# Patient Record
Sex: FEMALE | Race: WHITE | NOT HISPANIC OR LATINO | URBAN - METROPOLITAN AREA
[De-identification: names, ages, dates, MRNs, and addresses within clinical notes are randomized per-mention and may not be internally consistent; named-entity substitution may affect disease eponyms.]

---

## 2016-03-30 LAB — CRC RECOMMENDATION EXT: NORMAL

## 2021-05-27 LAB — CRC RECOMMENDATION EXT: NORMAL

## 2022-12-29 LAB
HUMAN PAPILLOMAVIRUS (HPV): NORMAL
PAP RECOMMENDATION EXT: NORMAL

## 2023-06-05 DIAGNOSIS — D05.11 DUCTAL CARCINOMA IN SITU (DCIS) OF RIGHT BREAST: Primary | ICD-10-CM

## 2023-06-05 DIAGNOSIS — R92.1 BREAST CALCIFICATION, RIGHT: ICD-10-CM

## 2023-06-05 DIAGNOSIS — Z86.000 HISTORY OF DUCTAL CARCINOMA IN SITU OF BREAST: ICD-10-CM

## 2023-06-05 DIAGNOSIS — D24.1 PAPILLOMA OF RIGHT BREAST: ICD-10-CM

## 2023-06-13 ENCOUNTER — HOSPITAL ENCOUNTER (OUTPATIENT)
Dept: RADIOLOGY | Facility: OTHER | Age: 58
Discharge: HOME | End: 2023-06-13
Attending: SURGERY
Payer: COMMERCIAL

## 2023-06-13 DIAGNOSIS — Z86.000 HISTORY OF DUCTAL CARCINOMA IN SITU OF BREAST: ICD-10-CM

## 2023-06-13 DIAGNOSIS — D24.1 PAPILLOMA OF RIGHT BREAST: ICD-10-CM

## 2023-06-13 DIAGNOSIS — R92.1 BREAST CALCIFICATION, RIGHT: ICD-10-CM

## 2023-06-13 DIAGNOSIS — D05.11 DUCTAL CARCINOMA IN SITU (DCIS) OF RIGHT BREAST: ICD-10-CM

## 2023-06-13 PROCEDURE — A9577 INJ MULTIHANCE: HCPCS

## 2023-06-13 PROCEDURE — 25500020 PHARM REV CODE 255

## 2023-06-13 PROCEDURE — 77049 MRI BREAST C-+ W/CAD BI: CPT | Mod: 26,,, | Performed by: RADIOLOGY

## 2023-06-13 PROCEDURE — 77049 MRI BREAST C-+ W/CAD BI: CPT | Mod: TC

## 2023-06-13 PROCEDURE — 77049 MRI BREAST W/WO CONTRAST, W/CAD, BILATERAL: ICD-10-PCS | Mod: 26,,, | Performed by: RADIOLOGY

## 2023-06-13 RX ADMIN — GADOBENATE DIMEGLUMINE 13 ML: 529 INJECTION, SOLUTION INTRAVENOUS at 03:06

## 2023-06-22 ENCOUNTER — TELEPHONE (OUTPATIENT)
Dept: RADIOLOGY | Facility: OTHER | Age: 58
End: 2023-06-22
Payer: COMMERCIAL

## 2023-06-22 ENCOUNTER — PATIENT MESSAGE (OUTPATIENT)
Dept: RADIOLOGY | Facility: OTHER | Age: 58
End: 2023-06-22
Payer: COMMERCIAL

## 2023-07-06 ENCOUNTER — OFFICE VISIT (OUTPATIENT)
Dept: PRIMARY CARE CLINIC | Facility: CLINIC | Age: 58
End: 2023-07-06
Payer: COMMERCIAL

## 2023-07-06 ENCOUNTER — TELEPHONE (OUTPATIENT)
Dept: PRIMARY CARE CLINIC | Facility: CLINIC | Age: 58
End: 2023-07-06

## 2023-07-06 VITALS
OXYGEN SATURATION: 96 % | DIASTOLIC BLOOD PRESSURE: 64 MMHG | HEART RATE: 79 BPM | SYSTOLIC BLOOD PRESSURE: 118 MMHG | WEIGHT: 139.13 LBS | HEIGHT: 63 IN | BODY MASS INDEX: 24.65 KG/M2

## 2023-07-06 DIAGNOSIS — H91.90 HEARING LOSS, UNSPECIFIED HEARING LOSS TYPE, UNSPECIFIED LATERALITY: ICD-10-CM

## 2023-07-06 DIAGNOSIS — Z85.3 HISTORY OF BREAST CANCER: ICD-10-CM

## 2023-07-06 DIAGNOSIS — Z80.0 FAMILY HISTORY OF COLON CANCER: ICD-10-CM

## 2023-07-06 DIAGNOSIS — Z00.00 WELLNESS EXAMINATION: Primary | ICD-10-CM

## 2023-07-06 PROCEDURE — 99386 PR PREVENTIVE VISIT,NEW,40-64: ICD-10-PCS | Mod: S$GLB,,, | Performed by: INTERNAL MEDICINE

## 2023-07-06 PROCEDURE — 3074F PR MOST RECENT SYSTOLIC BLOOD PRESSURE < 130 MM HG: ICD-10-PCS | Mod: CPTII,S$GLB,, | Performed by: INTERNAL MEDICINE

## 2023-07-06 PROCEDURE — 3008F BODY MASS INDEX DOCD: CPT | Mod: CPTII,S$GLB,, | Performed by: INTERNAL MEDICINE

## 2023-07-06 PROCEDURE — 99999 PR PBB SHADOW E&M-EST. PATIENT-LVL IV: CPT | Mod: PBBFAC,,, | Performed by: INTERNAL MEDICINE

## 2023-07-06 PROCEDURE — 1159F PR MEDICATION LIST DOCUMENTED IN MEDICAL RECORD: ICD-10-PCS | Mod: CPTII,S$GLB,, | Performed by: INTERNAL MEDICINE

## 2023-07-06 PROCEDURE — 3074F SYST BP LT 130 MM HG: CPT | Mod: CPTII,S$GLB,, | Performed by: INTERNAL MEDICINE

## 2023-07-06 PROCEDURE — 99386 PREV VISIT NEW AGE 40-64: CPT | Mod: S$GLB,,, | Performed by: INTERNAL MEDICINE

## 2023-07-06 PROCEDURE — 3078F PR MOST RECENT DIASTOLIC BLOOD PRESSURE < 80 MM HG: ICD-10-PCS | Mod: CPTII,S$GLB,, | Performed by: INTERNAL MEDICINE

## 2023-07-06 PROCEDURE — 3078F DIAST BP <80 MM HG: CPT | Mod: CPTII,S$GLB,, | Performed by: INTERNAL MEDICINE

## 2023-07-06 PROCEDURE — 3008F PR BODY MASS INDEX (BMI) DOCUMENTED: ICD-10-PCS | Mod: CPTII,S$GLB,, | Performed by: INTERNAL MEDICINE

## 2023-07-06 PROCEDURE — 1159F MED LIST DOCD IN RCRD: CPT | Mod: CPTII,S$GLB,, | Performed by: INTERNAL MEDICINE

## 2023-07-06 PROCEDURE — 99999 PR PBB SHADOW E&M-EST. PATIENT-LVL IV: ICD-10-PCS | Mod: PBBFAC,,, | Performed by: INTERNAL MEDICINE

## 2023-07-06 RX ORDER — ACETAMINOPHEN 500 MG
1000 TABLET ORAL DAILY
COMMUNITY

## 2023-07-06 RX ORDER — LETROZOLE 2.5 MG/1
2.5 TABLET, FILM COATED ORAL DAILY
COMMUNITY
Start: 2023-04-18 | End: 2023-10-10 | Stop reason: SDUPTHER

## 2023-07-06 NOTE — TELEPHONE ENCOUNTER
----- Message from Serenity Lubin MD sent at 7/6/2023  2:09 PM CDT -----  Porter Garvey in [facility]  last pap   Sherri Winn in [place] at [facility] colonoscopy

## 2023-07-06 NOTE — LETTER
AUTHORIZATION FOR RELEASE OF   CONFIDENTIAL INFORMATION    Dear [facility],    We are seeing Huong Patino, date of birth 1965, in the clinic at [facility]. Serenity Lubin MD is the patient's PCP. Huong Patino has an outstanding lab/procedure at the time we reviewed her chart. In order to help keep her health information updated, she has authorized us to request the following medical record(s):        (X)  MAMMOGRAM                                      (X)  COLONOSCOPY      (X)  PAP SMEAR                                          (  )  OUTSIDE LAB RESULTS     (  )  DEXA SCAN                                          (  )  EYE EXAM            (  )  FOOT EXAM                                          (  )  ENTIRE RECORD     (  )  OUTSIDE IMMUNIZATIONS                 (  )  _______________         Please fax records to [facility], Abby E. Gandolfi, MD, [phone]     If you have any questions, please contact [facility] at [phone].           Patient Name: Huong Patino  : 1965  Patient Phone #: [phone]

## 2023-07-06 NOTE — PROGRESS NOTES
[facility] Note    Chief Complaint      Chief Complaint   Patient presents with    [facility]     History of Present Illness      Huong Patino is a 57 y.o. female who presents today for chief complaint annual wellness and est care . Patient is new to me.    PCP: Serenity Lubin MD  Patient comes to appointment alone.     HPI   She recently moved here from [place], her daughter lives in . She is originally from [place]   H/o DCIS, dx in  she has had lumpectomy x1 and radiation, she had a follow up MMG and showed a new abnl so she is having a follow up lumpectomy 7.13 to exclude dcis on pathology, thought to be atypia 2/2 radiation    Breast mri 6.23. She would liek to est with high risk breast here   She is otherwise healthy   Some hearing changes, hearing loss in loud spaces, thinks he has some L ear TM damage 2/2 ear bud use   Some orthopedic issues   Saw Gyn in Dec   Father (73) and PGF(80s) ho colon cancer, both  of but at advanced ages, she had a cscope in , needed a 5 year follow   Genetic testing was discussed but ultimately not done due to advanced at at time of dx of colon cancer  She has had dexa in  was good   I personally reviewed all past medical, surgical, social and family history.  No other family medical history   She works a  and helath  business   Mood is ok, sleep is ok,   Diet and exercise as above     She had labs in  unremarkable, will wait until next yeat for lipids     Active Problem List with Overview Notes    Diagnosis Date Noted    History of breast cancer 2023     DCIS R breast dx       Family history of colon cancer 2023       Health Maintenance   Topic Date Due    Hepatitis C Screening  Never done    Lipid Panel  Never done    TETANUS VACCINE  Never done    Mammogram  2023       Past Medical History:   Diagnosis Date    Breast cancer        Past Surgical History:   Procedure Laterality Date     "BREAST SURGERY  2022    TONSILLECTOMY  1968       family history includes Cancer in her maternal grandfather; Hypertension in her mother.    Social History     Tobacco Use    Smoking status: Former     Packs/day: 0.25     Years: 5.00     Pack years: 1.25     Types: Cigarettes     Start date: 1984     Quit date: 1989     Years since quittin.5     Passive exposure: Never    Smokeless tobacco: Never   Substance Use Topics    Alcohol use: Yes     Alcohol/week: 3.0 standard drinks     Types: 3 Glasses of wine per week     Comment: over 2-3 days    Drug use: Never       Review of Systems   Constitutional:  Negative for chills, fever, malaise/fatigue and weight loss.   Respiratory:  Negative for cough, sputum production, shortness of breath and wheezing.    Cardiovascular:  Negative for chest pain, palpitations, orthopnea and leg swelling.   Gastrointestinal:  Negative for constipation, diarrhea, nausea and vomiting.   Genitourinary:  Negative for dysuria, frequency, hematuria and urgency.      Outpatient Encounter Medications as of 2023   Medication Sig Dispense Refill    cholecalciferol, vitamin D3, (VITAMIN D3) 50 mcg (2,000 unit) Cap capsule Take by mouth.      letrozole (FEMARA) 2.5 mg Tab Take 2.5 mg by mouth once daily.       No facility-administered encounter medications on file as of 2023.        Review of patient's allergies indicates:  No Known Allergies        Physical Exam      Vital Signs  Pulse: 79  SpO2: 96 %  BP: 118/64  BP Location: Left arm  Patient Position: Sitting  Height and Weight  Height: 5' 3" (160 cm)  Weight: 63.1 kg (139 lb 1.8 oz)  BSA (Calculated - sq m): 1.67 sq meters  BMI (Calculated): 24.6  Weight in (lb) to have BMI = 25: 140.8]    Physical Exam  Vitals reviewed.   Constitutional:       General: She is not in acute distress.     Appearance: She is well-developed. She is not diaphoretic.   HENT:      Head: Normocephalic and atraumatic.      Right Ear: External " ear normal.      Left Ear: External ear normal.      Nose: Nose normal.   Eyes:      General:         Right eye: No discharge.         Left eye: No discharge.      Conjunctiva/sclera: Conjunctivae normal.   Cardiovascular:      Rate and Rhythm: Normal rate and regular rhythm.      Heart sounds: Normal heart sounds.   Pulmonary:      Effort: Pulmonary effort is normal. No respiratory distress.      Breath sounds: Normal breath sounds.   Musculoskeletal:         General: Normal range of motion.      Cervical back: Normal range of motion.   Skin:     Coloration: Skin is not pale.      Findings: No rash.   Neurological:      Mental Status: She is alert and oriented to person, place, and time.   Psychiatric:         Behavior: Behavior normal.         Thought Content: Thought content normal.        Laboratory:  CBC:  No results for input(s): WBC, RBC, HGB, HCT, PLT, MCV, MCH, MCHC in the last 2160 hours.  CMP:  No results for input(s): GLU, CALCIUM, ALBUMIN, PROT, NA, K, CO2, CL, BUN, ALKPHOS, ALT, AST, BILITOT in the last 2160 hours.    Invalid input(s): CREATININ  URINALYSIS:  No results for input(s): COLORU, CLARITYU, SPECGRAV, PHUR, PROTEINUA, GLUCOSEU, BILIRUBINCON, BLOODU, WBCU, RBCU, BACTERIA, MUCUS, NITRITE, LEUKOCYTESUR, UROBILINOGEN, HYALINECASTS in the last 2160 hours.   LIPIDS:  No results for input(s): TSH, HDL, CHOL, TRIG, LDLCALC, CHOLHDL, NONHDLCHOL, TOTALCHOLEST in the last 2160 hours.  TSH:  No results for input(s): TSH in the last 2160 hours.  A1C:  No results for input(s): HGBA1C in the last 2160 hours.    Radiology:      Assessment/Plan     Huong Patino is a 57 y.o.female with:    1. Wellness examination    2. History of breast cancer  Overview:  DCIS R breast dx     Orders:  -     Ambulatory referral/consult to Oncology; Future; Expected date: 07/13/2023  -     Ambulatory referral/consult to Breast Surgery; Future; Expected date: 07/13/2023    3. Family history of colon cancer    4. Hearing loss,  unspecified hearing loss type, unspecified laterality  -     Ambulatory referral/consult to Audiology; Future; Expected date: 07/13/2023         Use of the [facility] Patient Portal discussed and encouraged during today's visit  -Continue current medications and maintain follow up with specialists.  Return to clinic in 1 year prn .  No future appointments.    Serenity Lubin MD  7/6/2023 1:51 PM    Primary Care Internal Medicine

## 2023-07-06 NOTE — Clinical Note
Porter Sheldon Gyn in [facility]  last pap  Sherri Winn in [place] at [facility] colonoscopy

## 2023-07-07 ENCOUNTER — PATIENT MESSAGE (OUTPATIENT)
Dept: ADMINISTRATIVE | Facility: HOSPITAL | Age: 58
End: 2023-07-07
Payer: COMMERCIAL

## 2023-07-07 ENCOUNTER — TELEPHONE (OUTPATIENT)
Dept: SURGERY | Facility: CLINIC | Age: 58
End: 2023-07-07
Payer: COMMERCIAL

## 2023-07-17 ENCOUNTER — PATIENT OUTREACH (OUTPATIENT)
Dept: ADMINISTRATIVE | Facility: HOSPITAL | Age: 58
End: 2023-07-17
Payer: COMMERCIAL

## 2023-07-17 NOTE — PROGRESS NOTES
Health Maintenance Due   Topic Date Due    Hepatitis C Screening  Never done    Lipid Panel  Never done    COVID-19 Vaccine (1) Never done    HIV Screening  Never done    TETANUS VACCINE  Never done    Colorectal Cancer Screening  Never done    Shingles Vaccine (1 of 2) Never done     Chart review done. HM updated. Immunizations reviewed & updated. Care Everywhere updated.  Pap from 12/29/22 uploaded to chart.

## 2023-07-25 ENCOUNTER — CLINICAL SUPPORT (OUTPATIENT)
Dept: AUDIOLOGY | Facility: CLINIC | Age: 58
End: 2023-07-25
Payer: COMMERCIAL

## 2023-07-25 DIAGNOSIS — H91.90 HEARING LOSS, UNSPECIFIED HEARING LOSS TYPE, UNSPECIFIED LATERALITY: ICD-10-CM

## 2023-07-25 DIAGNOSIS — H91.93 BILATERAL HEARING LOSS, UNSPECIFIED HEARING LOSS TYPE: Primary | ICD-10-CM

## 2023-07-25 PROCEDURE — 92567 TYMPANOMETRY: CPT | Mod: S$GLB,,, | Performed by: AUDIOLOGIST

## 2023-07-25 PROCEDURE — 99999 PR PBB SHADOW E&M-EST. PATIENT-LVL II: CPT | Mod: PBBFAC,,, | Performed by: AUDIOLOGIST

## 2023-07-25 PROCEDURE — 92557 PR COMPREHENSIVE HEARING TEST: ICD-10-PCS | Mod: S$GLB,,, | Performed by: AUDIOLOGIST

## 2023-07-25 PROCEDURE — 92557 COMPREHENSIVE HEARING TEST: CPT | Mod: S$GLB,,, | Performed by: AUDIOLOGIST

## 2023-07-25 PROCEDURE — 92567 PR TYMPA2METRY: ICD-10-PCS | Mod: S$GLB,,, | Performed by: AUDIOLOGIST

## 2023-07-25 PROCEDURE — 99999 PR PBB SHADOW E&M-EST. PATIENT-LVL II: ICD-10-PCS | Mod: PBBFAC,,, | Performed by: AUDIOLOGIST

## 2023-07-25 NOTE — Clinical Note
Hi Dr. Lubin,  Can you please put in a referral for ENT so Ms. Patino can have the ear wax removed from her left ear.   Thanks,  Serenity

## 2023-07-25 NOTE — PROGRESS NOTES
Huong Patino was seen today in the clinic for an audiologic evaluation.  Patient's main complaint was muffled hearing, especially with background noise present like in restaurants.  Ms. Patino reported she also has noticed her left ear was feeling clogged and was told by her primary care provider that she was impacted with wax. She has been using Debrox to soften the wax and has been using a cotton swab to try to move the wax.     Otoscopy revealed a thin sheet of wax blocking the right EAC and occluding cerumen in the left EAC. Cerumen was removed from the right EAC without incident using an illuminated curette. Wax was also removed from the distal portion of the EAC in the left ear; however, complete removal was not possible due to depth the cerumen had been pushed down the ear canal. Will recommend cleaning with ENT.    Tympanometry revealed Type A in the right ear and Type B (small EAC/cerumen impaction) in the left ear.     Audiogram results revealed essentially normal hearing with a mild hearing loss at 8000 Hz in the right ear and essentially normal hearing with a moderate hearing loss at 8000 Hz in the left ear.      Speech reception thresholds were noted at 10 dB in the right ear and 10 dB in the left ear.    Speech discrimination scores were 100% in the right ear and 100% in the left ear.    Results were discussed and reviewed with Ms. Patino following testing. It is recommended she see ENT regarding cerumen occlusion in the left ear.     Recommendations:  Otologic evaluation with ENT  Annual audiogram  Hearing protection when in noise

## 2023-07-28 ENCOUNTER — PATIENT OUTREACH (OUTPATIENT)
Dept: ADMINISTRATIVE | Facility: HOSPITAL | Age: 58
End: 2023-07-28
Payer: COMMERCIAL

## 2023-07-28 NOTE — PROGRESS NOTES
Health Maintenance Due   Topic Date Due    Hepatitis C Screening  Never done    Lipid Panel  Never done    COVID-19 Vaccine (1) Never done    HIV Screening  Never done    TETANUS VACCINE  Never done    Colorectal Cancer Screening  Never done    Shingles Vaccine (1 of 2) Never done     Chart review done. HM updated. Immunizations reviewed & updated. Care Everywhere updated.  Second fax received for 12/16/23 confirmed upload to chart.     
No

## 2023-07-30 DIAGNOSIS — H61.20 IMPACTED CERUMEN, UNSPECIFIED LATERALITY: Primary | ICD-10-CM

## 2023-10-10 ENCOUNTER — OFFICE VISIT (OUTPATIENT)
Dept: HEMATOLOGY/ONCOLOGY | Facility: CLINIC | Age: 58
End: 2023-10-10
Payer: COMMERCIAL

## 2023-10-10 VITALS
DIASTOLIC BLOOD PRESSURE: 80 MMHG | OXYGEN SATURATION: 96 % | RESPIRATION RATE: 16 BRPM | HEIGHT: 63 IN | BODY MASS INDEX: 25.12 KG/M2 | WEIGHT: 141.75 LBS | HEART RATE: 61 BPM | TEMPERATURE: 98 F | SYSTOLIC BLOOD PRESSURE: 132 MMHG

## 2023-10-10 DIAGNOSIS — Z79.811 USE OF LETROZOLE (FEMARA): ICD-10-CM

## 2023-10-10 DIAGNOSIS — D05.11 DUCTAL CARCINOMA IN SITU (DCIS) OF RIGHT BREAST: Primary | ICD-10-CM

## 2023-10-10 PROCEDURE — 3079F DIAST BP 80-89 MM HG: CPT | Mod: CPTII,S$GLB,, | Performed by: INTERNAL MEDICINE

## 2023-10-10 PROCEDURE — 3075F PR MOST RECENT SYSTOLIC BLOOD PRESS GE 130-139MM HG: ICD-10-PCS | Mod: CPTII,S$GLB,, | Performed by: INTERNAL MEDICINE

## 2023-10-10 PROCEDURE — 3075F SYST BP GE 130 - 139MM HG: CPT | Mod: CPTII,S$GLB,, | Performed by: INTERNAL MEDICINE

## 2023-10-10 PROCEDURE — 1160F RVW MEDS BY RX/DR IN RCRD: CPT | Mod: CPTII,S$GLB,, | Performed by: INTERNAL MEDICINE

## 2023-10-10 PROCEDURE — 99205 PR OFFICE/OUTPT VISIT, NEW, LEVL V, 60-74 MIN: ICD-10-PCS | Mod: S$GLB,,, | Performed by: INTERNAL MEDICINE

## 2023-10-10 PROCEDURE — 3008F BODY MASS INDEX DOCD: CPT | Mod: CPTII,S$GLB,, | Performed by: INTERNAL MEDICINE

## 2023-10-10 PROCEDURE — 1159F PR MEDICATION LIST DOCUMENTED IN MEDICAL RECORD: ICD-10-PCS | Mod: CPTII,S$GLB,, | Performed by: INTERNAL MEDICINE

## 2023-10-10 PROCEDURE — 1159F MED LIST DOCD IN RCRD: CPT | Mod: CPTII,S$GLB,, | Performed by: INTERNAL MEDICINE

## 2023-10-10 PROCEDURE — 99999 PR PBB SHADOW E&M-EST. PATIENT-LVL III: ICD-10-PCS | Mod: PBBFAC,,, | Performed by: INTERNAL MEDICINE

## 2023-10-10 PROCEDURE — 3008F PR BODY MASS INDEX (BMI) DOCUMENTED: ICD-10-PCS | Mod: CPTII,S$GLB,, | Performed by: INTERNAL MEDICINE

## 2023-10-10 PROCEDURE — 1160F PR REVIEW ALL MEDS BY PRESCRIBER/CLIN PHARMACIST DOCUMENTED: ICD-10-PCS | Mod: CPTII,S$GLB,, | Performed by: INTERNAL MEDICINE

## 2023-10-10 PROCEDURE — 3079F PR MOST RECENT DIASTOLIC BLOOD PRESSURE 80-89 MM HG: ICD-10-PCS | Mod: CPTII,S$GLB,, | Performed by: INTERNAL MEDICINE

## 2023-10-10 PROCEDURE — 99999 PR PBB SHADOW E&M-EST. PATIENT-LVL III: CPT | Mod: PBBFAC,,, | Performed by: INTERNAL MEDICINE

## 2023-10-10 PROCEDURE — 99205 OFFICE O/P NEW HI 60 MIN: CPT | Mod: S$GLB,,, | Performed by: INTERNAL MEDICINE

## 2023-10-10 RX ORDER — LETROZOLE 2.5 MG/1
2.5 TABLET, FILM COATED ORAL DAILY
Qty: 90 TABLET | Refills: 3 | Status: SHIPPED | OUTPATIENT
Start: 2023-10-10

## 2023-10-10 NOTE — PROGRESS NOTES
CONSULT NOTE    Subjective:       Patient ID: Huong Patino is a 58 y.o. female.  MRN: [id number]  : 1965    Chief Complaint: Right DCIS     History of Present Illness:   Huong Patino is a 58 y.o. female who is referred for Right breast DCIS. Extensive outside records reviewed and verified with the patient. See below.     Reports having mammogram and US since her 40's. Had an abnormal finding in the right breast in , initial biopsy was negative, then showed ADH. Underwent lumpectomy in April that showed DCIS. Then had adjuvant RT in  followed by Tamoxifen and then switched to Letrozole in  when additional abnormalities were detected. Had a biopsy in 2023 that showed DCIS.     Remains on Letrozole since 2023.   No major issues, maybe some weight gain. Very active, member and  at the [facility]. Denies joint pains, has minimal hot flashes, brought on by coffee or etoh. On vitamin D, had a normal DEXA last year.     Up to date with colonoscopy, will be establishing with Gyn.     Oncology History:  Debi Arrieta's notes 23 [facility] and updated   In summary- there have been 6 biopsies of the right breast  Sono core bx right breast (Dr. Hand) 22- negative  Stereo right breast Ca++ ([facility]) 22- ADH  LUMPECTOMY - DCIS 2.4cm high grade margins clear  MRI bx x 2 22- 6:00 and 10:00- papilloma/benign findings  Then RT right breast -   Went on tamoxifen early November and then switched to femara early February  Mammo and US 22- annual  US core bx- 22- Dr. Hand scar tissue- stromal fibrosis   Stereo Ca++ 23- DCIS right breast    RELEVANT IMAGING HISTORY  Date Facility Study Findings/Biopsy   2022 [facility] MRI of the breast Postcontrast delayed axial images demonstrate no axillary or internal mammary chain lymphadenopathy. No suspicious skin thickening is identified. There is  From: Timmy Chung  To: Balbina Stanley  ASAP. Thanks.     Nasima moderate background parenchymal enhancement within both breasts. The breast demonstrate heterogeneous MR parenchymal density.  Right breast: Postsurgical/postbiopsy changes are identified in the right upper outer breast skin, mid to posterior depth. There is a 5.6 x 2.3 cm postoperative seroma with minimal residual peripheral enhancement. This corresponds with site of the skin wrist of recent surgical excision. In the right slightly upper outer breast posterior depth at approximately 10:00 axis, there is a lobulated enhancing nodule which measures 0.8 in length by 0.6 cm in width by 0.6 cm craniocaudal. This demonstrates type II and type III enhancement kinetics. This is indeterminate. Further evaluation with sampling is recommended. In the right lower central breast mid depth at 6:00 access there is linear non-mass enhancement identified with spans up to 1.6 cm in length by 0.4 cm in width by 0.4 cm craniocaudal this demonstrates type I and type II enhancement kinetics. This is indeterminate. Further evaluation and sampling is recommended. Additional scattered less than 5 mm foci of background enhancement is identified.  Left breast: Scattered less than 5 mm foci of background enhancement are identified. No suspicious dominant enhancement is identified on the left.  Impression:  1. Postsurgical changes in the right upper outer breast including postoperative seroma.  2. 8 mm enhancing nodule in the right breast at 10:00 axis which is indeterminate. Further evaluation with Second Look MRI directed right breast ultrasound to guide biopsy is recommended. If a sonographic correlation is not identified MRI guided biopsy should be considered.  3. 1.6 cm non-mass enhancement in the right breast at 1:00 axis which is indeterminate. Further evaluation with second look MRI directed right breast ultrasound to guide biopsy is recommended. If no sonographic correlate is identified MRI guided biopsy should be considered.  4. No  suspicious dominant enhancement in the left   6/7/2022 [facility] MRI guided biopsy right multiple sites Diagnosis:  A. Right breast 10:00 cylinder enhancement MRI biopsy:  Benign breast tissue and several reactive lymph nodes  B. Right breast, 6:00 cylinder, enhancement MRI biopsy:  Intraductal papilloma  Postprocedure mammogram imaging demonstrates the location device at the targeted area      12/13/22 [facility] B/L Diagnostic Mammogram Findings:  Right breast: There is architectural distortion within the right upper outer quadrant compatible with the patient's history of malignant right lumpectomy. Within the posterior aspect of the right upper outer quadrant is a group of indeterminate microcalcifications for which a biopsy is recommended. They are amenable to a stereotactic vacuum-assisted biopsy. An apparent asymmetry within the right central breast disperses to normal tissue and is stable when compared to multiple prior mammograms. There are no suspicious dominant nodules. There are 3 biopsy clips.  Left breast: There are no new dominant nodules, suspicious clustered microcalcifications or areas of architectural distortion  Impression:  Indeterminate group of microcalcifications within the posterior aspect of the right upper outer quadrant. Stereotactic biopsy is recommended.   12/13/2022 [facility] B/L Breast Ultrasound Findings:  At the 12 o'clock position of the right breast 3 cm from the nipple is a 0.4 x 0.3 x 0.4 cm well-circumscribed cyst with debris. It is slightly smaller in size. The previously identified 0.8 cm hypoechoic nodule at the 9 o'clock position of the right breast 7 cm from the nipple is no longer identified. However at the 9 o'clock position of the right breast 3 to 4 cm from the nipple is a newly demonstrated 0.3 x 0.2 x 0.3 cm hypoechoic nodule. There is no associated shadowing or abnormal internal vascularity. Ultrasound-guided biopsy is recommended. No other  discrete cystic or solid lesions are identified in either breast. The left breast is unremarkable. There are no abnormal lymph nodes identified.  Impression:  Newly demonstrated 0.3 cm hypoechoic nodule at the 9 o'clock position of the right breast 3 to 4 cm from the nipple. Ultrasound-guided biopsy is recommended.   2022 (date unclear on report) Done in the surgeon's office Dr. Hand Ultrasound-guided core biopsy right Diagnosis:  Breast, right, 9:00 4 cm from the nipple:  Benign breast with stromal fibrosis focal microcalcification is noted there is no evidence of atypia or malignancy identified      2023 [facility] Right stereotactic core biopsy Diagnosis:  Right breast posterior calcifications stereotactic biopsy:  High-grade ductal carcinoma in situ, flat type, involving terminal ductules  Associated microcalcifications seen  Benign breast tissue with microcalcifications and multinucleated giant cells  Estrogen receptor 100%  Progesterone receptor 70%  There is no postprocedure mammogram although the procedure report does state that a clip was placed    Mastectomy was recommended but she wanted preservation and went to [facility]     Saw Dr. Lincoln at [facility]  right breast excision with pre-op wire localization on 23.  Pathology showed lobular atrophy and epithelial atypia consistent with radiation effect. No DCIS was seen.       History:  Past Medical History:   Diagnosis Date    Breast cancer       Past Surgical History:   Procedure Laterality Date    BREAST SURGERY  2022    TONSILLECTOMY  1968     Family History   Problem Relation Age of Onset    Hypertension Mother     Cancer Maternal Grandfather         colon cancer      Social History     Tobacco Use    Smoking status: Former     Current packs/day: 0.00     Average packs/day: 0.3 packs/day for 5.0 years (1.3 ttl pk-yrs)     Types: Cigarettes     Start date: 1984     Quit date: 1989     Years since quittin.8      "Passive exposure: Never    Smokeless tobacco: Never   Substance and Sexual Activity    Alcohol use: Yes     Alcohol/week: 3.0 standard drinks of alcohol     Types: 3 Glasses of wine per week     Comment: over 2-3 days    Drug use: Never    Sexual activity: Not Currently     Partners: Female        ROS:   Review of Systems   Constitutional:  Negative for fever, malaise/fatigue and weight loss.   HENT:  Negative for congestion, hearing loss, nosebleeds and sore throat.    Eyes:  Negative for double vision and photophobia.   Respiratory:  Negative for cough, hemoptysis, sputum production, shortness of breath and wheezing.    Cardiovascular:  Negative for chest pain, palpitations, orthopnea and leg swelling.   Gastrointestinal:  Negative for abdominal pain, blood in stool, constipation, diarrhea, heartburn, nausea and vomiting.   Genitourinary:  Negative for dysuria, hematuria and urgency.   Musculoskeletal:  Negative for back pain, joint pain and myalgias.   Skin:  Negative for itching and rash.   Neurological:  Negative for dizziness, tingling, seizures, weakness and headaches.   Endo/Heme/Allergies:  Negative for polydipsia. Does not bruise/bleed easily.   Psychiatric/Behavioral:  Negative for depression and memory loss. The patient is not nervous/anxious and does not have insomnia.         Objective:     Vitals:    10/10/23 1103   BP: 132/80   Pulse: 61   Resp: 16   Temp: 98 °F (36.7 °C)   SpO2: 96%   Weight: 64.3 kg (141 lb 12.1 oz)   Height: 5' 3" (1.6 m)   PainSc: 0-No pain       Physical Examination:   Physical Exam  Vitals and nursing note reviewed.   Constitutional:       General: She is not in acute distress.     Appearance: She is not diaphoretic.   HENT:      Head: Normocephalic.      Mouth/Throat:      Pharynx: No oropharyngeal exudate.   Eyes:      General: No scleral icterus.     Conjunctiva/sclera: Conjunctivae normal.   Neck:      Thyroid: No thyromegaly.   Cardiovascular:      Rate and Rhythm: Normal " "rate and regular rhythm.      Heart sounds: Normal heart sounds. No murmur heard.  Pulmonary:      Effort: Pulmonary effort is normal. No respiratory distress.      Breath sounds: No stridor. No wheezing or rales.   Chest:      Chest wall: No tenderness.   Abdominal:      General: Bowel sounds are normal. There is no distension.      Palpations: Abdomen is soft. There is no mass.      Tenderness: There is no abdominal tenderness. There is no rebound.   Musculoskeletal:         General: No tenderness or deformity. Normal range of motion.      Cervical back: Neck supple.   Lymphadenopathy:      Cervical: No cervical adenopathy.   Skin:     General: Skin is warm and dry.      Findings: No erythema or rash.   Neurological:      Mental Status: She is alert and oriented to person, place, and time.      Cranial Nerves: No cranial nerve deficit.      Coordination: Coordination normal.      Gait: Gait is intact.   Psychiatric:         Mood and Affect: Affect normal.         Cognition and Memory: Memory normal.         Judgment: Judgment normal.          Diagnostic Tests:  Significant Imaging: I have reviewed and interpreted all pertinent imaging results/findings.      Laboratory Data:  All pertinent labs have been reviewed.    Labs:   No results found for: "WBC", "HGB", "HCT", "MCV", "PLT"    Assessment/Plan:   Ductal carcinoma in situ (DCIS) of right breast  -     Mammo Digital Diagnostic Bilat; Future; Expected date: 10/10/2023  -     MRI Breast w/wo Contrast, w/CAD, Bilateral; Future; Expected date: 10/10/2023  -     letrozole (FEMARA) 2.5 mg Tab; Take 1 tablet (2.5 mg total) by mouth once daily.  Dispense: 90 tablet; Refill: 3  -     Ambulatory referral/consult to Gynecology; Future; Expected date: 10/17/2023    Use of letrozole (Femara)       She is s/p lumpectomy, adjuvant RT, brief Tamoxifen, repeat biopsy with DCIS, endocrine therapy changed to Letrozole and repeat lumpectomy with no residual DCIS.     Due to follow " up mammogram in December, will also obtain MRI to establish now post op baseline and will continue yearly mammogram +/- MRI as indicated.     Continue Letrozole.   Continue exercise, calcium and Vitamin D.     ECOG SCORE    0 - Fully active-able to carry on all pre-disease performance without restriction           Discussion:   No follow-ups on file.    Plan was discussed with the patient at length, and she verbalized understanding. Huong was given an opportunity to ask questions that were answered to her satisfaction, and she was advised to call in the interval if any problems or questions arise.    Electronically signed by Laurie Jacques MD    I spent 71 min on this encounter.    Route Chart for Scheduling    Med Onc Chart Routing      Follow up with physician . January 2024   Follow up with [facility]    Infusion scheduling note    Injection scheduling note    Labs    Imaging Mammogram and MRI   both in mid December.   Pharmacy appointment    Other referrals

## 2023-10-18 ENCOUNTER — OFFICE VISIT (OUTPATIENT)
Dept: OTOLARYNGOLOGY | Facility: CLINIC | Age: 58
End: 2023-10-18
Payer: COMMERCIAL

## 2023-10-18 DIAGNOSIS — H61.20 IMPACTED CERUMEN, UNSPECIFIED LATERALITY: ICD-10-CM

## 2023-10-18 PROCEDURE — 69210 REMOVE IMPACTED EAR WAX UNI: CPT | Mod: S$GLB,,, | Performed by: OTOLARYNGOLOGY

## 2023-10-18 PROCEDURE — 99499 NO LOS: ICD-10-PCS | Mod: S$GLB,,, | Performed by: OTOLARYNGOLOGY

## 2023-10-18 PROCEDURE — 99999 PR PBB SHADOW E&M-EST. PATIENT-LVL I: ICD-10-PCS | Mod: PBBFAC,,, | Performed by: OTOLARYNGOLOGY

## 2023-10-18 PROCEDURE — 69210 PR REMOVAL IMPACTED CERUMEN REQUIRING INSTRUMENTATION, UNILATERAL: ICD-10-PCS | Mod: S$GLB,,, | Performed by: OTOLARYNGOLOGY

## 2023-10-18 PROCEDURE — 99499 UNLISTED E&M SERVICE: CPT | Mod: S$GLB,,, | Performed by: OTOLARYNGOLOGY

## 2023-10-18 PROCEDURE — 99999 PR PBB SHADOW E&M-EST. PATIENT-LVL I: CPT | Mod: PBBFAC,,, | Performed by: OTOLARYNGOLOGY

## 2023-10-18 NOTE — PROGRESS NOTES
Has L CI.    Procedure Note:    The patient was brought to the minor procedure room and placed under the operating microscope. Using a combination of suction, curettes and cup forceps the patient's cerumen impaction was removed. The tympanic membrane was evaluated and was unremarkable. The patient tolerated the procedure well. There were no complications.    P: F/U prn.

## 2023-10-25 ENCOUNTER — TELEPHONE (OUTPATIENT)
Dept: RADIOLOGY | Facility: HOSPITAL | Age: 58
End: 2023-10-25
Payer: COMMERCIAL

## 2023-10-25 NOTE — TELEPHONE ENCOUNTER
----- Message from Alex Pan sent at 10/25/2023 10:59 AM CDT -----  Pt would like to be called back asap regarding rescheduling her 12/14/23 mammogram appt    Pt can be reached at [phone].    Thanks

## 2023-10-26 ENCOUNTER — TELEPHONE (OUTPATIENT)
Dept: RADIOLOGY | Facility: HOSPITAL | Age: 58
End: 2023-10-26
Payer: COMMERCIAL

## 2023-10-26 NOTE — TELEPHONE ENCOUNTER
----- Message from Laura Castellano sent at 10/25/2023  3:33 PM CDT -----  Regarding: Reschedule for 12/22/23 or later  Contact: [phone]  Pt requesting a call back to reschedule her mammogram on 12/14 to 12/22 or later. Please call to discuss further.

## 2023-12-22 ENCOUNTER — HOSPITAL ENCOUNTER (OUTPATIENT)
Dept: RADIOLOGY | Facility: HOSPITAL | Age: 58
Discharge: HOME | End: 2023-12-22
Attending: INTERNAL MEDICINE
Payer: COMMERCIAL

## 2023-12-22 DIAGNOSIS — D05.11 DUCTAL CARCINOMA IN SITU (DCIS) OF RIGHT BREAST: ICD-10-CM

## 2023-12-22 PROCEDURE — 77049 MRI BREAST W/WO CONTRAST, W/CAD, BILATERAL: ICD-10-PCS | Mod: 26,,, | Performed by: RADIOLOGY

## 2023-12-22 PROCEDURE — A9577 INJ MULTIHANCE: HCPCS | Performed by: INTERNAL MEDICINE

## 2023-12-22 PROCEDURE — 77049 MRI BREAST C-+ W/CAD BI: CPT | Mod: TC

## 2023-12-22 PROCEDURE — 77049 MRI BREAST C-+ W/CAD BI: CPT | Mod: 26,,, | Performed by: RADIOLOGY

## 2023-12-22 PROCEDURE — 25500020 PHARM REV CODE 255: Performed by: INTERNAL MEDICINE

## 2023-12-22 RX ADMIN — GADOBENATE DIMEGLUMINE 13 ML: 529 INJECTION, SOLUTION INTRAVENOUS at 02:12

## 2023-12-26 ENCOUNTER — HOSPITAL ENCOUNTER (OUTPATIENT)
Dept: RADIOLOGY | Facility: HOSPITAL | Age: 58
Discharge: HOME | End: 2023-12-26
Attending: INTERNAL MEDICINE
Payer: COMMERCIAL

## 2023-12-26 VITALS — HEIGHT: 63 IN | BODY MASS INDEX: 24.45 KG/M2 | WEIGHT: 138 LBS

## 2023-12-26 DIAGNOSIS — D05.11 DUCTAL CARCINOMA IN SITU (DCIS) OF RIGHT BREAST: ICD-10-CM

## 2023-12-26 PROCEDURE — 77062 BREAST TOMOSYNTHESIS BI: CPT | Mod: 26,,, | Performed by: RADIOLOGY

## 2023-12-26 PROCEDURE — 77062 MAMMO DIGITAL DIAGNOSTIC BILAT WITH TOMO: ICD-10-PCS | Mod: 26,,, | Performed by: RADIOLOGY

## 2023-12-26 PROCEDURE — 77062 BREAST TOMOSYNTHESIS BI: CPT | Mod: TC

## 2023-12-26 PROCEDURE — 77066 MAMMO DIGITAL DIAGNOSTIC BILAT WITH TOMO: ICD-10-PCS | Mod: 26,,, | Performed by: RADIOLOGY

## 2023-12-26 PROCEDURE — 77066 DX MAMMO INCL CAD BI: CPT | Mod: 26,,, | Performed by: RADIOLOGY

## 2024-02-06 DIAGNOSIS — Z12.11 COLON CANCER SCREENING: ICD-10-CM

## 2024-02-14 ENCOUNTER — PATIENT MESSAGE (OUTPATIENT)
Dept: HEMATOLOGY/ONCOLOGY | Facility: CLINIC | Age: 59
End: 2024-02-14
Payer: COMMERCIAL

## 2024-02-14 ENCOUNTER — PATIENT MESSAGE (OUTPATIENT)
Dept: PRIMARY CARE CLINIC | Facility: CLINIC | Age: 59
End: 2024-02-14
Payer: COMMERCIAL

## 2024-02-20 ENCOUNTER — OFFICE VISIT (OUTPATIENT)
Dept: HEMATOLOGY/ONCOLOGY | Facility: CLINIC | Age: 59
End: 2024-02-20
Payer: COMMERCIAL

## 2024-02-20 VITALS
DIASTOLIC BLOOD PRESSURE: 80 MMHG | OXYGEN SATURATION: 100 % | RESPIRATION RATE: 14 BRPM | WEIGHT: 140.63 LBS | TEMPERATURE: 98 F | HEART RATE: 59 BPM | BODY MASS INDEX: 24.92 KG/M2 | SYSTOLIC BLOOD PRESSURE: 133 MMHG

## 2024-02-20 DIAGNOSIS — Z79.811 USE OF LETROZOLE (FEMARA): ICD-10-CM

## 2024-02-20 DIAGNOSIS — D05.12 DUCTAL CARCINOMA IN SITU (DCIS) OF LEFT BREAST: Primary | ICD-10-CM

## 2024-02-20 PROBLEM — D05.10 DCIS (DUCTAL CARCINOMA IN SITU): Status: ACTIVE | Noted: 2024-02-20

## 2024-02-20 PROCEDURE — 99215 OFFICE O/P EST HI 40 MIN: CPT | Mod: S$GLB,,, | Performed by: INTERNAL MEDICINE

## 2024-02-20 PROCEDURE — 3075F SYST BP GE 130 - 139MM HG: CPT | Mod: CPTII,S$GLB,, | Performed by: INTERNAL MEDICINE

## 2024-02-20 PROCEDURE — 3079F DIAST BP 80-89 MM HG: CPT | Mod: CPTII,S$GLB,, | Performed by: INTERNAL MEDICINE

## 2024-02-20 PROCEDURE — 99999 PR PBB SHADOW E&M-EST. PATIENT-LVL III: CPT | Mod: PBBFAC,,, | Performed by: INTERNAL MEDICINE

## 2024-02-20 PROCEDURE — 3008F BODY MASS INDEX DOCD: CPT | Mod: CPTII,S$GLB,, | Performed by: INTERNAL MEDICINE

## 2024-02-20 NOTE — PROGRESS NOTES
PROGRESS NOTE     Subjective:       Patient ID: Huong Patino is a 58 y.o. female.  MRN: [id number]  : 1965    Chief Complaint: Right DCIS     History of Present Illness:   Huong Patino is a 58 y.o. female who is referred for Right breast DCIS. Extensive outside records reviewed and verified with the patient. See below.     Reports having mammogram and US since her 40's. Had an abnormal finding in the right breast in , initial biopsy was negative, then showed ADH. Underwent lumpectomy in April that showed DCIS. Then had adjuvant RT in  followed by Tamoxifen and then switched to Letrozole in  when additional abnormalities were detected. Had a biopsy in 2023 that showed DCIS.     Remains on Letrozole since 2023.   No major issues, maybe some weight gain. Very active, member and  at the [employer] [employer] [facility].   Has noticed some mild joint stiffness at times.   Has minimal hot flashes, sometimes brought on by coffee or etoh.   On vitamin D, had a normal DEXA last year.     Up to date with colonoscopy, will be establishing with Gyn.     Oncology History:  Debi Arrieta's notes 23 [facility] and updated   In summary- there have been 6 biopsies of the right breast  Sono core bx right breast (Dr. Hand) 22- negative  Stereo right breast Ca++ ([facility]) 22- ADH  LUMPECTOMY - DCIS 2.4cm high grade margins clear  MRI bx x 2 22- 6:00 and 10:00- papilloma/benign findings  Then RT right breast -   Went on tamoxifen early November and then switched to femara early February  Mammo and US 22- annual  US core bx- 22- Dr. Hand scar tissue- stromal fibrosis   Stereo Ca++ 23- DCIS right breast    RELEVANT IMAGING HISTORY  Date Facility Study Findings/Biopsy   2022 [facility] MRI of the breast Postcontrast delayed axial images demonstrate no axillary or internal mammary chain lymphadenopathy. No suspicious  skin thickening is identified. There is moderate background parenchymal enhancement within both breasts. The breast demonstrate heterogeneous MR parenchymal density.  Right breast: Postsurgical/postbiopsy changes are identified in the right upper outer breast skin, mid to posterior depth. There is a 5.6 x 2.3 cm postoperative seroma with minimal residual peripheral enhancement. This corresponds with site of the skin wrist of recent surgical excision. In the right slightly upper outer breast posterior depth at approximately 10:00 axis, there is a lobulated enhancing nodule which measures 0.8 in length by 0.6 cm in width by 0.6 cm craniocaudal. This demonstrates type II and type III enhancement kinetics. This is indeterminate. Further evaluation with sampling is recommended. In the right lower central breast mid depth at 6:00 access there is linear non-mass enhancement identified with spans up to 1.6 cm in length by 0.4 cm in width by 0.4 cm craniocaudal this demonstrates type I and type II enhancement kinetics. This is indeterminate. Further evaluation and sampling is recommended. Additional scattered less than 5 mm foci of background enhancement is identified.  Left breast: Scattered less than 5 mm foci of background enhancement are identified. No suspicious dominant enhancement is identified on the left.  Impression:  1. Postsurgical changes in the right upper outer breast including postoperative seroma.  2. 8 mm enhancing nodule in the right breast at 10:00 axis which is indeterminate. Further evaluation with Second Look MRI directed right breast ultrasound to guide biopsy is recommended. If a sonographic correlation is not identified MRI guided biopsy should be considered.  3. 1.6 cm non-mass enhancement in the right breast at 1:00 axis which is indeterminate. Further evaluation with second look MRI directed right breast ultrasound to guide biopsy is recommended. If no sonographic correlate is identified MRI  guided biopsy should be considered.  4. No suspicious dominant enhancement in the left   6/7/2022 [facility] MRI guided biopsy right multiple sites Diagnosis:  A. Right breast 10:00 cylinder enhancement MRI biopsy:  Benign breast tissue and several reactive lymph nodes  B. Right breast, 6:00 cylinder, enhancement MRI biopsy:  Intraductal papilloma  Postprocedure mammogram imaging demonstrates the location device at the targeted area      12/13/22 [facility] Care B/L Diagnostic Mammogram Findings:  Right breast: There is architectural distortion within the right upper outer quadrant compatible with the patient's history of malignant right lumpectomy. Within the posterior aspect of the right upper outer quadrant is a group of indeterminate microcalcifications for which a biopsy is recommended. They are amenable to a stereotactic vacuum-assisted biopsy. An apparent asymmetry within the right central breast disperses to normal tissue and is stable when compared to multiple prior mammograms. There are no suspicious dominant nodules. There are 3 biopsy clips.  Left breast: There are no new dominant nodules, suspicious clustered microcalcifications or areas of architectural distortion  Impression:  Indeterminate group of microcalcifications within the posterior aspect of the right upper outer quadrant. Stereotactic biopsy is recommended.   12/13/2022 [facility] Breast Ultrasound Findings:  At the 12 o'clock position of the right breast 3 cm from the nipple is a 0.4 x 0.3 x 0.4 cm well-circumscribed cyst with debris. It is slightly smaller in size. The previously identified 0.8 cm hypoechoic nodule at the 9 o'clock position of the right breast 7 cm from the nipple is no longer identified. However at the 9 o'clock position of the right breast 3 to 4 cm from the nipple is a newly demonstrated 0.3 x 0.2 x 0.3 cm hypoechoic nodule. There is no associated shadowing or abnormal internal vascularity.  Ultrasound-guided biopsy is recommended. No other discrete cystic or solid lesions are identified in either breast. The left breast is unremarkable. There are no abnormal lymph nodes identified.  Impression:  Newly demonstrated 0.3 cm hypoechoic nodule at the 9 o'clock position of the right breast 3 to 4 cm from the nipple. Ultrasound-guided biopsy is recommended.   12/2022 (date unclear on report) Done in the surgeon's office Dr. Hand Ultrasound-guided core biopsy right Diagnosis:  Breast, right, 9:00 4 cm from the nipple:  Benign breast with stromal fibrosis focal microcalcification is noted there is no evidence of atypia or malignancy identified      1/13/2023 [facility] Right stereotactic core biopsy Diagnosis:  Right breast posterior calcifications stereotactic biopsy:  High-grade ductal carcinoma in situ, flat type, involving terminal ductules  Associated microcalcifications seen  Benign breast tissue with microcalcifications and multinucleated giant cells  Estrogen receptor 100%  Progesterone receptor 70%  There is no postprocedure mammogram although the procedure report does state that a clip was placed    Mastectomy was recommended but she wanted preservation and went to [facility]     Saw Dr. Lincoln at [facility]  right breast excision with pre-op wire localization on 7/13/23.  Pathology showed lobular atrophy and epithelial atypia consistent with radiation effect. No DCIS was seen.       History:  Past Medical History:   Diagnosis Date    Breast cancer       Past Surgical History:   Procedure Laterality Date    BREAST BIOPSY Right     BREAST CYST ASPIRATION Bilateral     BREAST LUMPECTOMY Right     BREAST SURGERY  4/19/2022    TONSILLECTOMY  1968     Family History   Problem Relation Age of Onset    Hypertension Mother     Cancer Maternal Grandfather         colon cancer      Social History     Tobacco Use    Smoking status: Former     Current packs/day: 0.00     Average packs/day: 0.3 packs/day for  5.0 years (1.3 ttl pk-yrs)     Types: Cigarettes     Start date: 1984     Quit date: 1989     Years since quittin.1     Passive exposure: Never    Smokeless tobacco: Never   Substance and Sexual Activity    Alcohol use: Yes     Alcohol/week: 3.0 standard drinks of alcohol     Types: 3 Glasses of wine per week     Comment: over 2-3 days    Drug use: Never    Sexual activity: Not Currently     Partners: Female        ROS:   Review of Systems   Constitutional:  Negative for fever, malaise/fatigue and weight loss.   HENT:  Negative for congestion, hearing loss, nosebleeds and sore throat.    Eyes:  Negative for double vision and photophobia.   Respiratory:  Negative for cough, hemoptysis, sputum production, shortness of breath and wheezing.    Cardiovascular:  Negative for chest pain, palpitations, orthopnea and leg swelling.   Gastrointestinal:  Negative for abdominal pain, blood in stool, constipation, diarrhea, heartburn, nausea and vomiting.   Genitourinary:  Negative for dysuria, hematuria and urgency.   Musculoskeletal:  Negative for back pain, joint pain and myalgias.   Skin:  Negative for itching and rash.   Neurological:  Negative for dizziness, tingling, seizures, weakness and headaches.   Endo/Heme/Allergies:  Negative for polydipsia. Does not bruise/bleed easily.   Psychiatric/Behavioral:  Negative for depression and memory loss. The patient is not nervous/anxious and does not have insomnia.         Objective:     Vitals:    24 1204   BP: 133/80   Pulse: (!) 59   Resp: 14   Temp: 97.5 °F (36.4 °C)   TempSrc: Oral   SpO2: 100%   Weight: 63.8 kg (140 lb 10.5 oz)   PainSc: 0-No pain         Physical Examination:   Physical Exam  Vitals and nursing note reviewed.   Constitutional:       General: She is not in acute distress.     Appearance: She is not diaphoretic.   HENT:      Head: Normocephalic.      Mouth/Throat:      Pharynx: No oropharyngeal exudate.   Eyes:      General: No scleral  "icterus.     Conjunctiva/sclera: Conjunctivae normal.   Neck:      Thyroid: No thyromegaly.   Cardiovascular:      Rate and Rhythm: Normal rate and regular rhythm.      Heart sounds: Normal heart sounds. No murmur heard.  Pulmonary:      Effort: Pulmonary effort is normal. No respiratory distress.      Breath sounds: No stridor. No wheezing or rales.   Chest:      Chest wall: No tenderness.   Abdominal:      General: Bowel sounds are normal. There is no distension.      Palpations: Abdomen is soft. There is no mass.      Tenderness: There is no abdominal tenderness. There is no rebound.   Musculoskeletal:         General: No tenderness or deformity. Normal range of motion.      Cervical back: Neck supple.   Lymphadenopathy:      Cervical: No cervical adenopathy.   Skin:     General: Skin is warm and dry.      Findings: No erythema or rash.   Neurological:      Mental Status: She is alert and oriented to person, place, and time.      Cranial Nerves: No cranial nerve deficit.      Coordination: Coordination normal.      Gait: Gait is intact.   Psychiatric:         Mood and Affect: Affect normal.         Cognition and Memory: Memory normal.         Judgment: Judgment normal.          Diagnostic Tests:  Significant Imaging: I have reviewed and interpreted all pertinent imaging results/findings.      Laboratory Data:  All pertinent labs have been reviewed.    Labs:   No results found for: "WBC", "HGB", "HCT", "MCV", "PLT"    Assessment/Plan:   Ductal carcinoma in situ (DCIS) of left breast    Use of letrozole (Femara)         She is s/p lumpectomy, adjuvant RT, brief Tamoxifen, repeat biopsy with DCIS, endocrine therapy changed to Letrozole and repeat lumpectomy with no residual DCIS.     S/p MRI and  mammogram in December, stable, will continue yearly mammogram +/- MRI as indicated.     Continue Letrozole.   Continue exercise, calcium and Vitamin D. DEXA every 2 years.     ECOG SCORE    0 - Fully active-able to carry on " all pre-disease performance without restriction           Discussion:   No follow-ups on file.    Plan was discussed with the patient at length, and she verbalized understanding. Huong was given an opportunity to ask questions that were answered to her satisfaction, and she was advised to call in the interval if any problems or questions arise.    Electronically signed by Laurie Jacques MD      Route Chart for Scheduling    Med Onc Chart Routing      Follow up with physician 6 months. breast med onc   Follow up with [facility]    Infusion scheduling note    Injection scheduling note    Labs    Imaging    Pharmacy appointment    Other referrals

## 2024-02-29 ENCOUNTER — OFFICE VISIT (OUTPATIENT)
Dept: OBSTETRICS AND GYNECOLOGY | Facility: CLINIC | Age: 59
End: 2024-02-29
Attending: OBSTETRICS & GYNECOLOGY
Payer: COMMERCIAL

## 2024-02-29 VITALS — BODY MASS INDEX: 25.27 KG/M2 | HEIGHT: 63 IN | WEIGHT: 142.63 LBS

## 2024-02-29 DIAGNOSIS — Z01.419 ENCOUNTER FOR GYNECOLOGICAL EXAMINATION (GENERAL) (ROUTINE) WITHOUT ABNORMAL FINDINGS: Primary | ICD-10-CM

## 2024-02-29 DIAGNOSIS — D05.11 DUCTAL CARCINOMA IN SITU (DCIS) OF RIGHT BREAST: ICD-10-CM

## 2024-02-29 PROCEDURE — 99999 PR PBB SHADOW E&M-EST. PATIENT-LVL III: CPT | Mod: PBBFAC,,, | Performed by: OBSTETRICS & GYNECOLOGY

## 2024-02-29 PROCEDURE — 3008F BODY MASS INDEX DOCD: CPT | Mod: CPTII,S$GLB,, | Performed by: OBSTETRICS & GYNECOLOGY

## 2024-02-29 PROCEDURE — 99386 PREV VISIT NEW AGE 40-64: CPT | Mod: S$GLB,,, | Performed by: OBSTETRICS & GYNECOLOGY

## 2024-02-29 PROCEDURE — 1159F MED LIST DOCD IN RCRD: CPT | Mod: CPTII,S$GLB,, | Performed by: OBSTETRICS & GYNECOLOGY

## 2024-02-29 NOTE — PROGRESS NOTES
Subjective:       Patient ID: Huong Patino is a 58 y.o. female.    Chief Complaint:  Annual Exam (Last pap/hpv  normal/Mammo 2023 birads 1)        History of Present Illness  Huong Patino is a 58 y.o. female  who presents for new patient annual. Has DCIS of the right breast, s/p lumpectomy x 2 and is on Femara and doing well. Second lumpectomy was not DCIS. Moved her recently and is establishing providers. Considering a new relationship. Discussed. Info Gardasil, will consider. All questions answered.    No LMP recorded. Patient is postmenopausal.   Date of Last Pap: 2022    Review of Systems  Review of Systems   Constitutional:  Negative for chills and fever.        Objective:   Physical Exam:   Constitutional: She is oriented to person, place, and time. Vital signs are normal. She appears well-developed and well-nourished. No distress.        Pulmonary/Chest: She exhibits no mass. Right breast exhibits no mass, no nipple discharge, no skin change, no tenderness, no bleeding and no swelling. Left breast exhibits no mass, no nipple discharge, no skin change, no tenderness, no bleeding and no swelling. Breasts are symmetrical.   Scar right side, previous lumpectomy            Abdominal: Soft. Bowel sounds are normal. She exhibits no distension and no mass. There is no abdominal tenderness. There is no rebound.     Genitourinary:    Vagina and uterus normal.   There is no rash, tenderness, lesion or injury on the right labia. There is no rash, tenderness, lesion or injury on the left labia. Cervix is normal. Right adnexum displays no mass, no tenderness and no fullness. Left adnexum displays no mass, no tenderness and no fullness. No erythema, vaginal discharge, tenderness, rectocele, cystocele or prolapse of vaginal walls in the vagina. Cervix exhibits no motion tenderness, no discharge and no friability. Uterus is not deviated, not enlarged, not fixed, not tender and not hosting fibroids.            Musculoskeletal: Normal range of motion and moves all extremeties.      Lymphadenopathy:        Right: No supraclavicular adenopathy present.        Left: No supraclavicular adenopathy present.    Neurological: She is alert and oriented to person, place, and time.    Skin: Skin is warm and dry.    Psychiatric: She has a normal mood and affect. Her behavior is normal. Judgment normal.        Assessment/ Plan:     1. Encounter for gynecological examination (general) (routine) without abnormal findings        2. Ductal carcinoma in situ (DCIS) of right breast  Ambulatory referral/consult to Gynecology      Mildly atrophic, can consider Intrarosa if needed    No follow-ups on file.    As of April 1, 2021, the Cures Act has been passed nationally. This new law requires that all doctors progress notes, lab results, pathology reports and radiology reports be released IMMEDIATELY to the patient in the patient portal. That means that the results are released to you at the EXACT same time they are released to me. Therefore, with all of the patients that I have I am not able to reply to each patient exactly when the results come in. So there will be a delay from when you see the results to when I see them and have time to come up with a response to send you. Also I only see these results when I am on the computer at work. So if the results come in over the weekend or after 5 pm of a work day, I will not see them until the next business day. As you can tell, this is a challenge as a physician to give every patient the quick response they hope for and deserve. So please be patient! Thanks for understanding, Dr. Turner

## 2024-03-07 ENCOUNTER — TELEPHONE (OUTPATIENT)
Dept: HEMATOLOGY/ONCOLOGY | Facility: CLINIC | Age: 59
End: 2024-03-07
Payer: COMMERCIAL

## 2024-03-07 NOTE — TELEPHONE ENCOUNTER
----- Message from Gamaliel Shah sent at 3/7/2024  8:29 AM CST -----  Type: Need Medical Advice   Who Called: Dr Lincoln office  Best callback number: phone    fax   Additional Information: called to have the patient summary of care faxed   Please call to further assist, Thanks.

## 2024-03-20 ENCOUNTER — PATIENT OUTREACH (OUTPATIENT)
Dept: ADMINISTRATIVE | Facility: HOSPITAL | Age: 59
End: 2024-03-20
Payer: COMMERCIAL

## 2024-03-20 NOTE — PROGRESS NOTES
Health Maintenance Due   Topic Date Due    Hepatitis C Screening  Never done    Lipid Panel  Never done    COVID-19 Vaccine (1) Never done    HIV Screening  Never done    TETANUS VACCINE  Never done    Hemoglobin A1c (Diabetic Prevention Screening)  Never done    Shingles Vaccine (1 of 2) Never done    RSV Vaccine (Age 60+ and Pregnant patients) (1 - 1-dose 60+ series) Never done     Chart review completed. HM Updated. Triggered Links. Immunizations reviewed and updated. Care Everywhere Updated. Care Team Updated.  Imported outside colonoscopy results from [facility] into /media.

## 2024-04-19 ENCOUNTER — PATIENT MESSAGE (OUTPATIENT)
Dept: PRIMARY CARE CLINIC | Facility: CLINIC | Age: 59
End: 2024-04-19
Payer: COMMERCIAL

## 2024-05-03 ENCOUNTER — PATIENT OUTREACH (OUTPATIENT)
Dept: ADMINISTRATIVE | Facility: HOSPITAL | Age: 59
End: 2024-05-03
Payer: COMMERCIAL

## 2024-05-03 NOTE — PROGRESS NOTES
Health Maintenance Due   Topic Date Due    Hepatitis C Screening  Never done    Lipid Panel  Never done    HIV Screening  Never done    TETANUS VACCINE  Never done    Hemoglobin A1c (Diabetic Prevention Screening)  Never done    Shingles Vaccine (1 of 2) Never done    COVID-19 Vaccine (1 - 2023-24 season) Never done    RSV Vaccine (Age 60+ and Pregnant patients) (1 - 1-dose 60+ series) Never done     Chart review completed.  Updated. Triggered Links. Immunizations reviewed and updated. Care Everywhere Updated. Care Team Updated.  Imported outside colonoscopy results from [facility] into /media.

## 2024-06-10 ENCOUNTER — TELEPHONE (OUTPATIENT)
Dept: HEMATOLOGY/ONCOLOGY | Facility: CLINIC | Age: 59
End: 2024-06-10
Payer: COMMERCIAL

## 2024-06-10 DIAGNOSIS — D05.12 DUCTAL CARCINOMA IN SITU (DCIS) OF LEFT BREAST: Primary | ICD-10-CM

## 2024-06-10 NOTE — TELEPHONE ENCOUNTER
Spoke to patient, she stated her breast surgeon Dr. Lincoln in [place] would like her to have mammo's done every 6 months , for the first two years after surgery.  This is a former jen patient. Informed patient message sent to dr donaldson for advice.         ----- Message from Giovana Squires RN sent at 6/7/2024  4:28 PM CDT -----  Regarding: FW: Consult/Advisory  Contact: Huong Patino    ----- Message -----  From: Miguel Pak  Sent: 6/7/2024   4:07 PM CDT  To: Nat [facility] Staff  Subject: Consult/Advisory                                 Consult/Advisory    Name Of Caller: Huong Patino       Contact Preference: [phone] (home)      Nature of call: Patient calling to get additional mammogram in June or July for Dr. Liz Lincoln who is requesting one now. Patient asking if that doctor should send the orders here or should she get it from Dr. Donaldson. All notes from Dr. Lincoln and  Jen are visible to Dr Donaldson as patient did link them through the portal.

## 2024-06-10 NOTE — TELEPHONE ENCOUNTER
----- Message from Imani Johns MD sent at 6/10/2024 11:15 AM CDT -----  Regarding: RE: Consult/Advisory  Contact: Huong Patino  done  ----- Message -----  From: Giovana Squires RN  Sent: 6/10/2024  11:04 AM CDT  To: Imani Johns MD  Subject: FW: Consult/Advisory                             Spoke to patient, she stated her breast surgeon Dr. Lincoln in [place] would like her to have mammo's done every 6 months , for the first two years after surgery.  This is a former [facility]attie patient. If okay please place mammo orders.     Thanks  ----- Message -----  From: Giovana Squires RN  Sent: 6/7/2024   4:28 PM CDT  To: Giovana Squires RN  Subject: FW: Consult/Advisory                               ----- Message -----  From: Miguel Pak  Sent: 6/7/2024   4:07 PM CDT  To: Nat Diallo Staff  Subject: Consult/Advisory                                 Consult/Advisory    Name Of Caller: Huong Patino       Contact Preference: [phone] (home)      Nature of call: Patient calling to get additional mammogram in June or July for Dr. Liz Lincoln who is requesting one now. Patient asking if that doctor should send the orders here or should she get it from Dr. Johns. All notes from Dr. Lincoln and  Sawyer are visible to Dr Johns as patient did link them through the portal.

## 2024-06-11 ENCOUNTER — HOSPITAL ENCOUNTER (OUTPATIENT)
Dept: RADIOLOGY | Facility: HOSPITAL | Age: 59
Discharge: HOME | End: 2024-06-11
Attending: INTERNAL MEDICINE
Payer: COMMERCIAL

## 2024-06-11 DIAGNOSIS — D05.12 DUCTAL CARCINOMA IN SITU (DCIS) OF LEFT BREAST: ICD-10-CM

## 2024-06-11 PROCEDURE — 77067 SCR MAMMO BI INCL CAD: CPT | Mod: TC

## 2024-06-11 PROCEDURE — 77063 BREAST TOMOSYNTHESIS BI: CPT | Mod: TC

## 2024-07-09 ENCOUNTER — TELEPHONE (OUTPATIENT)
Dept: PRIMARY CARE CLINIC | Facility: CLINIC | Age: 59
End: 2024-07-09
Payer: COMMERCIAL

## 2024-07-09 DIAGNOSIS — Z00.00 WELLNESS EXAMINATION: Primary | ICD-10-CM

## 2024-07-09 NOTE — TELEPHONE ENCOUNTER
----- Message from Jojo Pak sent at 7/9/2024  2:03 PM CDT -----  Contact: [phone]  type: Lab    Caller is requesting to schedule their Lab appointment prior to an appointment on 09/30.    Order is not listed in EPIC.  Please enter order and contact patient to schedule an am appt at the [facility] location a week before her annual.             Thank you

## 2024-07-09 NOTE — TELEPHONE ENCOUNTER
Do you want to order labs for pt before sept visit? Doesn't look like you have ever ordered labs for her

## 2024-07-22 ENCOUNTER — TELEPHONE (OUTPATIENT)
Dept: OPTOMETRY | Facility: CLINIC | Age: 59
End: 2024-07-22
Payer: COMMERCIAL

## 2024-07-25 ENCOUNTER — OFFICE VISIT (OUTPATIENT)
Dept: OPTOMETRY | Facility: CLINIC | Age: 59
End: 2024-07-25
Payer: COMMERCIAL

## 2024-07-25 DIAGNOSIS — H52.203 MYOPIA WITH ASTIGMATISM AND PRESBYOPIA, BILATERAL: ICD-10-CM

## 2024-07-25 DIAGNOSIS — H43.393 VISUAL FLOATERS, BILATERAL: ICD-10-CM

## 2024-07-25 DIAGNOSIS — H25.13 SENILE NUCLEAR SCLEROSIS, BILATERAL: Primary | ICD-10-CM

## 2024-07-25 DIAGNOSIS — H52.13 MYOPIA WITH ASTIGMATISM AND PRESBYOPIA, BILATERAL: ICD-10-CM

## 2024-07-25 DIAGNOSIS — H52.4 MYOPIA WITH ASTIGMATISM AND PRESBYOPIA, BILATERAL: ICD-10-CM

## 2024-07-25 PROCEDURE — 99999 PR PBB SHADOW E&M-EST. PATIENT-LVL II: CPT | Mod: PBBFAC,,, | Performed by: OPTOMETRIST

## 2024-07-25 PROCEDURE — 1160F RVW MEDS BY RX/DR IN RCRD: CPT | Mod: CPTII,S$GLB,, | Performed by: OPTOMETRIST

## 2024-07-25 PROCEDURE — 1159F MED LIST DOCD IN RCRD: CPT | Mod: CPTII,S$GLB,, | Performed by: OPTOMETRIST

## 2024-07-25 PROCEDURE — 92015 DETERMINE REFRACTIVE STATE: CPT | Mod: S$GLB,,, | Performed by: OPTOMETRIST

## 2024-07-25 PROCEDURE — 92004 COMPRE OPH EXAM NEW PT 1/>: CPT | Mod: S$GLB,,, | Performed by: OPTOMETRIST

## 2024-07-25 NOTE — PROGRESS NOTES
HPI    BLANCO: 1/2023 Dr. Bond([place])  Chief complaint (CC): Pt would like a new RX for her CL's(RGP's)  Glasses? +  Contacts? +  H/o eye surgery, injections or laser: -  H/o eye injury: -  Known eye conditions? -  Family h/o eye conditions? -  Eye gtts? At's PRN OU      (-) Flashes (-)  Floaters (-) Mucous   (-)  Tearing (-) Itching (-) Burning   (-) Headaches (-) Eye Pain/discomfort (-) Irritation   (-)  Redness (-) Double vision (+) Blurry vision- for near when she wears   her CL's    Diabetic? -  A1c? -    Last edited by Juan Jose Santiago on 7/25/2024  9:28 AM.            Assessment /Plan     For exam results, see Encounter Report.      Senile nuclear sclerosis, bilateral  Nuclear sclerotic cataract - not visually significant. Observe.    Myopia with astigmatism and presbyopia, bilateral  SRx released to patient. Patient educated on lens options. Normal ocular health. RTC 1 year for routine exam.   Order RGP. Pt needs CLFU at dispense.     Visual floaters, bilateral  No e/o h/b/t 360 degrees OU. Monitor for worsening of symptoms or S/Sx of RD.

## 2024-07-31 ENCOUNTER — TELEPHONE (OUTPATIENT)
Dept: OPTOMETRY | Facility: CLINIC | Age: 59
End: 2024-07-31
Payer: COMMERCIAL

## 2024-08-07 ENCOUNTER — OFFICE VISIT (OUTPATIENT)
Dept: OPTOMETRY | Facility: CLINIC | Age: 59
End: 2024-08-07
Payer: COMMERCIAL

## 2024-08-07 DIAGNOSIS — H52.4 MYOPIA WITH ASTIGMATISM AND PRESBYOPIA, BILATERAL: Primary | ICD-10-CM

## 2024-08-07 DIAGNOSIS — H52.13 MYOPIA WITH ASTIGMATISM AND PRESBYOPIA, BILATERAL: Primary | ICD-10-CM

## 2024-08-07 DIAGNOSIS — H52.203 MYOPIA WITH ASTIGMATISM AND PRESBYOPIA, BILATERAL: Primary | ICD-10-CM

## 2024-08-11 ENCOUNTER — PATIENT MESSAGE (OUTPATIENT)
Dept: OPTOMETRY | Facility: CLINIC | Age: 59
End: 2024-08-11
Payer: COMMERCIAL

## 2024-08-20 ENCOUNTER — OFFICE VISIT (OUTPATIENT)
Dept: HEMATOLOGY/ONCOLOGY | Facility: CLINIC | Age: 59
End: 2024-08-20
Payer: COMMERCIAL

## 2024-08-20 VITALS
WEIGHT: 140.44 LBS | HEIGHT: 63 IN | DIASTOLIC BLOOD PRESSURE: 64 MMHG | OXYGEN SATURATION: 96 % | SYSTOLIC BLOOD PRESSURE: 131 MMHG | TEMPERATURE: 98 F | BODY MASS INDEX: 24.88 KG/M2 | HEART RATE: 73 BPM

## 2024-08-20 DIAGNOSIS — D05.12 DUCTAL CARCINOMA IN SITU (DCIS) OF LEFT BREAST: Primary | ICD-10-CM

## 2024-08-20 DIAGNOSIS — Z79.811 USE OF LETROZOLE (FEMARA): ICD-10-CM

## 2024-08-20 PROCEDURE — 3008F BODY MASS INDEX DOCD: CPT | Mod: CPTII,S$GLB,, | Performed by: INTERNAL MEDICINE

## 2024-08-20 PROCEDURE — 3078F DIAST BP <80 MM HG: CPT | Mod: CPTII,S$GLB,, | Performed by: INTERNAL MEDICINE

## 2024-08-20 PROCEDURE — 3075F SYST BP GE 130 - 139MM HG: CPT | Mod: CPTII,S$GLB,, | Performed by: INTERNAL MEDICINE

## 2024-08-20 PROCEDURE — 99999 PR PBB SHADOW E&M-EST. PATIENT-LVL III: CPT | Mod: PBBFAC,,, | Performed by: INTERNAL MEDICINE

## 2024-08-20 PROCEDURE — G2211 COMPLEX E/M VISIT ADD ON: HCPCS | Mod: S$GLB,,, | Performed by: INTERNAL MEDICINE

## 2024-08-20 PROCEDURE — 99215 OFFICE O/P EST HI 40 MIN: CPT | Mod: S$GLB,,, | Performed by: INTERNAL MEDICINE

## 2024-08-20 NOTE — PROGRESS NOTES
[facility]/ [facility]   at [facility] Note:      Chief Complaint:   Encounter Diagnoses   Name Primary?    Ductal carcinoma in situ (DCIS) of left breast Yes    Use of letrozole (Femara)          HPI:  Huong Patino is a 58 y.o. female who presents today for breast cancer follow up    Oncology History  Patient initially seen in [place]  Routine screening mammogram 2022 with calcifications in the R breast   Biopsy 22: ADH  R breast lumpectomy 22: DCIS, grade 3, %/ TN 20%  MRI breast May 2022 with abnormality   MRI guided biopsy 22: papilloma/benign    Adjuvant XRT completed 2022    Tamoxifen 2022    Annual mammogram/ultrasound 22 with abnormal calcifications  Biopsy 22: stromal fibrosis, repeat biopsy 23: DCIS, %/ TN 70%    Letrozole 2023     Abnormality in R breast 2023  Seen by [facility] surgeon (Dr. Lincoln)  R breast excision 23: lobular atrophy and epithelial atypia consistent with radiation effect. No DCIS         Social History     Tobacco Use    Smoking status: Former     Current packs/day: 0.00     Average packs/day: 0.3 packs/day for 5.0 years (1.3 ttl pk-yrs)     Types: Cigarettes     Start date: 1984     Quit date: 1989     Years since quittin.6     Passive exposure: Never    Smokeless tobacco: Never   Substance Use Topics    Alcohol use: Yes     Alcohol/week: 3.0 standard drinks of alcohol     Types: 3 Glasses of wine per week     Comment: over 2-3 days    Drug use: Never     Family History   Problem Relation Name Age of Onset    Hypertension Mother Anais de la Cour     Cancer Maternal Grandfather Liborio Luciopel         colon cancer     Past Medical History:   Diagnosis Date    Breast cancer      Past Surgical History:   Procedure Laterality Date    BREAST BIOPSY Right     BREAST CYST ASPIRATION Bilateral     BREAST LUMPECTOMY Right     BREAST SURGERY   "4/19/2022    TONSILLECTOMY  1968       Patient Active Problem List   Diagnosis    History of breast cancer    Family history of colon cancer    DCIS (ductal carcinoma in situ)    Use of letrozole (Femara)       Current Outpatient Medications   Medication Instructions    cholecalciferol (vitamin D3) (VITAMIN D3) 1,000 Units, Oral, Daily    letrozole (FEMARA) 2.5 mg, Oral, Daily       Review of Systems:   Review of Systems   Constitutional: Negative.    HENT: Negative.     Respiratory: Negative.     Cardiovascular: Negative.    Gastrointestinal: Negative.    Musculoskeletal: Negative.    Neurological: Negative.    All other systems reviewed and are negative.      PHYSICAL EXAM:  /64 (BP Location: Right arm, Patient Position: Sitting, BP Method: Medium (Automatic))   Pulse 73   Temp 98 °F (36.7 °C) (Oral)   Ht 5' 3" (1.6 m)   Wt 63.7 kg (140 lb 6.9 oz)   SpO2 96%   BMI 24.88 kg/m²     General Appearance:    Alert, cooperative, no distress, appears stated age   Head:    Normocephalic, without obvious abnormality, atraumatic   Throat:   Lips, mucosa, and tongue normal; teeth and gums normal   Lungs:     Clear to auscultation bilaterally, respirations unlabored    Heart:    Regular rate and rhythm, S1 and S2 normal   Breast Exam:    S/p right lumpectomy, no mass or nodularity. Left breast without mass, nodule or skin changes. Nipple without inversion. No axillary or supraclavicular adenopathy.   Abdomen:     Soft, non-tender, bowel sounds active, no masses, no organomegaly   Extremities:   Extremities normal, atraumatic, no cyanosis or edema   Pulses:   2+ and symmetric all extremities   Skin:   Skin color, texture, turgor normal, no rashes or lesions   Lymph nodes:   Cervical, supraclavicular, and axillary nodes normal   Neurologic:   CNII-XII intact, normal gait           Pertinent Labs & Imaging:  Pathology Results  (Last 30 days)      None            No results found for this or any previous visit (from " the past 24 hour(s)).    Assessment & Plan:    1. Ductal carcinoma in situ (DCIS) of left breast    2. Use of letrozole (Femara)      Reviewed patients referring notes, imaging and pathology. Discussed diagnosis, staging, and treatment in detail with patient.   Patient with DCIS in the R breast now on letrozole  Overall tolerating treatment well  Reviewed mammogram June 2024  Given history of multiple biopsies with abnormalities, [facility] surgeon recommends twice yearly mammography  Follow up in 6 mos   Mammogram December 2024    Route Chart for Scheduling    Med Onc Chart Routing      Follow up with physician 6 months.   Follow up with [facility]    Infusion scheduling note    Injection scheduling note    Labs    Imaging Mammogram   December   Pharmacy appointment    Other referrals                     MDM includes  :    - Acute or chronic illness or injury that poses a threat to life or bodily function  - Review of prior external notes from unique source  - Independent review and explanation of 3+ results from unique tests  - Discussion of management and ordering 1 unique tests  - Extensive discussion of treatment and management  - Prescription drug management  - Drug therapy requiring intensive monitoring for toxicity        Imani Johns MD   08/20/2024

## 2024-08-23 ENCOUNTER — TELEPHONE (OUTPATIENT)
Dept: HEMATOLOGY/ONCOLOGY | Facility: CLINIC | Age: 59
End: 2024-08-23
Payer: COMMERCIAL

## 2024-09-25 ENCOUNTER — LAB VISIT (OUTPATIENT)
Dept: LAB | Facility: HOSPITAL | Age: 59
End: 2024-09-25
Attending: INTERNAL MEDICINE
Payer: COMMERCIAL

## 2024-09-25 DIAGNOSIS — Z00.00 WELLNESS EXAMINATION: ICD-10-CM

## 2024-09-25 LAB
ALBUMIN SERPL BCP-MCNC: 4 G/DL (ref 3.5–5.2)
ALP SERPL-CCNC: 54 U/L (ref 55–135)
ALT SERPL W/O P-5'-P-CCNC: 16 U/L (ref 10–44)
ANION GAP SERPL CALC-SCNC: 10 MMOL/L (ref 8–16)
AST SERPL-CCNC: 21 U/L (ref 10–40)
BILIRUB SERPL-MCNC: 0.5 MG/DL (ref 0.1–1)
BUN SERPL-MCNC: 16 MG/DL (ref 6–20)
CALCIUM SERPL-MCNC: 9.5 MG/DL (ref 8.7–10.5)
CHLORIDE SERPL-SCNC: 107 MMOL/L (ref 95–110)
CHOLEST SERPL-MCNC: 213 MG/DL (ref 120–199)
CHOLEST/HDLC SERPL: 3 {RATIO} (ref 2–5)
CO2 SERPL-SCNC: 24 MMOL/L (ref 23–29)
CREAT SERPL-MCNC: 0.9 MG/DL (ref 0.5–1.4)
ERYTHROCYTE [DISTWIDTH] IN BLOOD BY AUTOMATED COUNT: 12.8 % (ref 11.5–14.5)
EST. GFR  (NO RACE VARIABLE): >60 ML/MIN/1.73 M^2
GLUCOSE SERPL-MCNC: 80 MG/DL (ref 70–110)
HCT VFR BLD AUTO: 42.3 % (ref 37–48.5)
HDLC SERPL-MCNC: 70 MG/DL (ref 40–75)
HDLC SERPL: 32.9 % (ref 20–50)
HGB BLD-MCNC: 13.7 G/DL (ref 12–16)
LDLC SERPL CALC-MCNC: 127.2 MG/DL (ref 63–159)
MCH RBC QN AUTO: 30.1 PG (ref 27–31)
MCHC RBC AUTO-ENTMCNC: 32.4 G/DL (ref 32–36)
MCV RBC AUTO: 93 FL (ref 82–98)
NONHDLC SERPL-MCNC: 143 MG/DL
PLATELET # BLD AUTO: 250 K/UL (ref 150–450)
PMV BLD AUTO: 10.6 FL (ref 9.2–12.9)
POTASSIUM SERPL-SCNC: 4.1 MMOL/L (ref 3.5–5.1)
PROT SERPL-MCNC: 6.7 G/DL (ref 6–8.4)
RBC # BLD AUTO: 4.55 M/UL (ref 4–5.4)
SODIUM SERPL-SCNC: 141 MMOL/L (ref 136–145)
TRIGL SERPL-MCNC: 79 MG/DL (ref 30–150)
WBC # BLD AUTO: 4.48 K/UL (ref 3.9–12.7)

## 2024-09-25 PROCEDURE — 80061 LIPID PANEL: CPT | Performed by: INTERNAL MEDICINE

## 2024-09-25 PROCEDURE — 36415 COLL VENOUS BLD VENIPUNCTURE: CPT | Performed by: INTERNAL MEDICINE

## 2024-09-25 PROCEDURE — 80053 COMPREHEN METABOLIC PANEL: CPT | Performed by: INTERNAL MEDICINE

## 2024-09-25 PROCEDURE — 85027 COMPLETE CBC AUTOMATED: CPT | Performed by: INTERNAL MEDICINE

## 2024-09-30 ENCOUNTER — OFFICE VISIT (OUTPATIENT)
Dept: PRIMARY CARE CLINIC | Facility: CLINIC | Age: 59
End: 2024-09-30
Payer: COMMERCIAL

## 2024-09-30 VITALS
WEIGHT: 139.56 LBS | BODY MASS INDEX: 24.73 KG/M2 | HEART RATE: 68 BPM | SYSTOLIC BLOOD PRESSURE: 118 MMHG | DIASTOLIC BLOOD PRESSURE: 65 MMHG | OXYGEN SATURATION: 98 % | HEIGHT: 63 IN

## 2024-09-30 DIAGNOSIS — Z00.00 WELLNESS EXAMINATION: Primary | ICD-10-CM

## 2024-09-30 DIAGNOSIS — I10 ELEVATED BLOOD PRESSURE READING IN OFFICE WITH DIAGNOSIS OF HYPERTENSION: ICD-10-CM

## 2024-09-30 DIAGNOSIS — Z78.0 MENOPAUSE: ICD-10-CM

## 2024-09-30 DIAGNOSIS — D05.12 DUCTAL CARCINOMA IN SITU (DCIS) OF LEFT BREAST: ICD-10-CM

## 2024-09-30 PROCEDURE — 99396 PREV VISIT EST AGE 40-64: CPT | Mod: S$GLB,,, | Performed by: INTERNAL MEDICINE

## 2024-09-30 PROCEDURE — 3008F BODY MASS INDEX DOCD: CPT | Mod: CPTII,S$GLB,, | Performed by: INTERNAL MEDICINE

## 2024-09-30 PROCEDURE — 1159F MED LIST DOCD IN RCRD: CPT | Mod: CPTII,S$GLB,, | Performed by: INTERNAL MEDICINE

## 2024-09-30 PROCEDURE — 3074F SYST BP LT 130 MM HG: CPT | Mod: CPTII,S$GLB,, | Performed by: INTERNAL MEDICINE

## 2024-09-30 PROCEDURE — 99999 PR PBB SHADOW E&M-EST. PATIENT-LVL III: CPT | Mod: PBBFAC,,, | Performed by: INTERNAL MEDICINE

## 2024-09-30 PROCEDURE — 3078F DIAST BP <80 MM HG: CPT | Mod: CPTII,S$GLB,, | Performed by: INTERNAL MEDICINE

## 2024-09-30 NOTE — PROGRESS NOTES
[facility] Note    Chief Complaint      Chief Complaint   Patient presents with    Annual Exam     History of Present Illness      Huong Patino is a 58 y.o. female who presents today for chief complaint annual .     HPI   Last seen 7 annual, labs 9.24  otherwise unremarkable     She recently moved here from [place], her daughter lives in . She is originally from [place]     H/o DCIS, dx in  she has had lumpectomy x1 and radiation, she had a follow up MMG and showed a new abnl so she is having a follow up lumpectomy 7. to exclude dcis on pathology, thought to be atypia 2/2 radiation . Breast mri .. She is seeing VICENTE Johns - she is on letrozole, she was prev on tamoxifen because she had recurrence on temoxifen, some inconsistent joint pain on the medication, she does PT for her shoulder, also some pain in 4th finger right hadn DIP. 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  Father (73) and PGF(80s) ho colon cancer, both  of but at advanced ages, she had a cscope in , needed a 5 year follow up. Genetic testing was discussed but ultimately not done due to advanced at at time of dx of colon cancer  Mmg 6.24  Pap 12.22  Dexa due, last   I personally reviewed all past medical, surgical, social and family history.  No other family medical history   She works a  and helath  business   Mood is ok, sleep is ok,   Diet and exercise as above, diet is good    She will get flu shot at work next week  Covid 12.23  Active Problem List with Overview Notes    Diagnosis Date Noted    DCIS (ductal carcinoma in situ) 2024    Use of letrozole (Femara) 2024    History of breast cancer 2023     DCIS R breast dx       Family history of colon cancer 2023       Health Maintenance   Topic Date Due    Hepatitis C Screening  Never done    TETANUS VACCINE  Never done    Shingles Vaccine (1 of 2) Never done    Mammogram  2025    Lipid Panel  2029    Colorectal Cancer Screening  2031       Past Medical History:   Diagnosis Date    Breast cancer        Past Surgical History:   Procedure Laterality Date    BREAST BIOPSY Right     BREAST CYST ASPIRATION Bilateral     BREAST LUMPECTOMY Right     BREAST SURGERY  2022    TONSILLECTOMY         family history includes Cancer in her maternal grandfather; Hypertension in her mother.    Social History     Tobacco Use    Smoking status: Former     Current packs/day: 0.00     Average packs/day: 0.3 packs/day for 5.0 years (1.3 ttl pk-yrs)     Types: Cigarettes     Start date: 1984     Quit date: 1989     Years since quittin.7     Passive exposure: Never    Smokeless tobacco: Never   Substance Use Topics    Alcohol use: Yes     Alcohol/week: 3.0 standard drinks of alcohol     Types: 3 Glasses of wine per week     Comment: over 2-3 days    Drug use: Never       Review of Systems   Constitutional:  Negative for chills, fever, malaise/fatigue and weight loss.   Respiratory:  Negative for cough, sputum production, shortness of breath and wheezing.    Cardiovascular:  Negative for chest pain, palpitations, orthopnea and leg swelling.   Gastrointestinal:  Negative for constipation, diarrhea, nausea and vomiting.   Genitourinary:  Negative for dysuria, frequency, hematuria and urgency.        Outpatient Encounter Medications as of 2024   Medication Sig Dispense Refill    cholecalciferol,  "vitamin D3, (VITAMIN D3) 50 mcg (2,000 unit) Cap capsule Take 1,000 Units by mouth once daily.      letrozole (FEMARA) 2.5 mg Tab Take 1 tablet (2.5 mg total) by mouth once daily. 90 tablet 3     No facility-administered encounter medications on file as of 9/30/2024.        Review of patient's allergies indicates:  No Known Allergies        Physical Exam      Vital Signs  Pulse: 68  SpO2: 98 %  BP: 118/65  BP Location: Right arm  Patient Position: Sitting  Height and Weight  Height: 5' 3" (160 cm)  Weight: 63.3 kg (139 lb 8.8 oz)  BSA (Calculated - sq m): 1.68 sq meters  BMI (Calculated): 24.7  Weight in (lb) to have BMI = 25: 140.8]    Physical Exam  Vitals reviewed.   Constitutional:       General: She is not in acute distress.     Appearance: She is well-developed. She is not diaphoretic.   HENT:      Head: Normocephalic and atraumatic.      Right Ear: External ear normal.      Left Ear: External ear normal.      Nose: Nose normal.   Eyes:      General:         Right eye: No discharge.         Left eye: No discharge.      Conjunctiva/sclera: Conjunctivae normal.   Cardiovascular:      Rate and Rhythm: Normal rate and regular rhythm.      Heart sounds: Normal heart sounds.   Pulmonary:      Effort: Pulmonary effort is normal. No respiratory distress.   Musculoskeletal:         General: Normal range of motion.      Cervical back: Normal range of motion.   Skin:     Coloration: Skin is not pale.      Findings: No rash.   Neurological:      Mental Status: She is alert and oriented to person, place, and time.   Psychiatric:         Behavior: Behavior normal.         Thought Content: Thought content normal.          Laboratory:  CBC:  Recent Labs   Lab Result Units 09/25/24  0714   WBC K/uL 4.48   RBC M/uL 4.55   Hemoglobin g/dL 13.7   Hematocrit % 42.3   Platelets K/uL 250   MCV fL 93   MCH pg 30.1   MCHC g/dL 32.4     CMP:  Recent Labs   Lab Result Units 09/25/24  0714   Glucose mg/dL 80   Calcium mg/dL 9.5   Albumin " "g/dL 4.0   Total Protein g/dL 6.7   Sodium mmol/L 141   Potassium mmol/L 4.1   CO2 mmol/L 24   Chloride mmol/L 107   BUN mg/dL 16   Alkaline Phosphatase U/L 54*   ALT U/L 16   AST U/L 21   Total Bilirubin mg/dL 0.5     URINALYSIS:  No results for input(s): "COLORU", "CLARITYU", "SPECGRAV", "PHUR", "PROTEINUA", "GLUCOSEU", "BILIRUBINCON", "BLOODU", "WBCU", "RBCU", "BACTERIA", "MUCUS", "NITRITE", "LEUKOCYTESUR", "UROBILINOGEN", "HYALINECASTS" in the last 2160 hours.   LIPIDS:  Recent Labs   Lab Result Units 09/25/24  0714   HDL mg/dL 70   Cholesterol mg/dL 213*   Triglycerides mg/dL 79   LDL Cholesterol mg/dL 127.2   HDL/Cholesterol Ratio % 32.9   Non-HDL Cholesterol mg/dL 143   Total Cholesterol/HDL Ratio  3.0         Assessment/Plan     Huong Patino is a 58 y.o.female with:    1. Wellness examination    2. Menopause  -     DXA Bone Density Axial Skeleton 1 or more sites; Future; Expected date: 09/30/2024    3. Ductal carcinoma in situ (DCIS) of left breast  Assessment & Plan:  Per heme onc      4. Elevated blood pressure reading in office with diagnosis of hypertension  Better on recheck, encouraged home monitoring and cuff calibration        Use of the [employer] Patient Portal discussed and encouraged during today's visit  -Continue current medications and maintain follow up with specialists.  Return to clinic in 1 year .  Future Appointments   Date Time Provider Department Center   12/11/2024  8:45 AM [facility] Delfin Lubin MD  9/30/2024 1:44 PM    [facility]                     "

## 2024-10-15 ENCOUNTER — HOSPITAL ENCOUNTER (OUTPATIENT)
Dept: RADIOLOGY | Facility: CLINIC | Age: 59
Discharge: HOME | End: 2024-10-15
Attending: INTERNAL MEDICINE
Payer: COMMERCIAL

## 2024-10-15 DIAGNOSIS — Z78.0 MENOPAUSE: ICD-10-CM

## 2024-10-15 PROCEDURE — 77080 DXA BONE DENSITY AXIAL: CPT | Mod: TC

## 2024-12-11 ENCOUNTER — HOSPITAL ENCOUNTER (OUTPATIENT)
Dept: RADIOLOGY | Facility: HOSPITAL | Age: 59
Discharge: HOME | End: 2024-12-11
Attending: INTERNAL MEDICINE
Payer: COMMERCIAL

## 2024-12-11 DIAGNOSIS — Z79.811 USE OF LETROZOLE (FEMARA): ICD-10-CM

## 2024-12-11 DIAGNOSIS — D05.12 DUCTAL CARCINOMA IN SITU (DCIS) OF LEFT BREAST: ICD-10-CM

## 2024-12-11 PROCEDURE — 77063 BREAST TOMOSYNTHESIS BI: CPT | Mod: 26,,, | Performed by: RADIOLOGY

## 2024-12-11 PROCEDURE — 77063 BREAST TOMOSYNTHESIS BI: CPT | Mod: TC

## 2024-12-11 PROCEDURE — 77067 SCR MAMMO BI INCL CAD: CPT | Mod: 26,,, | Performed by: RADIOLOGY

## 2025-01-03 ENCOUNTER — PATIENT MESSAGE (OUTPATIENT)
Dept: HEMATOLOGY/ONCOLOGY | Facility: CLINIC | Age: 60
End: 2025-01-03
Payer: COMMERCIAL

## 2025-01-03 DIAGNOSIS — D05.11 DUCTAL CARCINOMA IN SITU (DCIS) OF RIGHT BREAST: ICD-10-CM

## 2025-01-06 RX ORDER — LETROZOLE 2.5 MG/1
2.5 TABLET, FILM COATED ORAL DAILY
Qty: 90 TABLET | Refills: 3 | Status: SHIPPED | OUTPATIENT
Start: 2025-01-06

## 2025-03-14 ENCOUNTER — OFFICE VISIT (OUTPATIENT)
Dept: HEMATOLOGY/ONCOLOGY | Facility: CLINIC | Age: 60
End: 2025-03-14
Payer: COMMERCIAL

## 2025-03-14 DIAGNOSIS — D05.12 DUCTAL CARCINOMA IN SITU (DCIS) OF LEFT BREAST: ICD-10-CM

## 2025-03-14 DIAGNOSIS — D05.11 DUCTAL CARCINOMA IN SITU (DCIS) OF RIGHT BREAST: Primary | ICD-10-CM

## 2025-03-14 PROCEDURE — 99214 OFFICE O/P EST MOD 30 MIN: CPT | Mod: S$GLB,,, | Performed by: INTERNAL MEDICINE

## 2025-03-14 PROCEDURE — 99999 PR PBB SHADOW E&M-EST. PATIENT-LVL I: CPT | Mod: PBBFAC,,, | Performed by: INTERNAL MEDICINE

## 2025-03-14 PROCEDURE — G2211 COMPLEX E/M VISIT ADD ON: HCPCS | Mod: S$GLB,,, | Performed by: INTERNAL MEDICINE

## 2025-03-14 NOTE — PROGRESS NOTES
[facility]/ [facility]   at [facility] Note:      Chief Complaint:   Encounter Diagnoses   Name Primary?    Ductal carcinoma in situ (DCIS) of right breast Yes    Ductal carcinoma in situ (DCIS) of left breast            HPI:  Huong Patino is a 59 y.o. female who presents today for breast cancer follow up. She has been on letrozole for recurrent DCIS. She is worried about her bone density. She has been doing increased weight bearing exercises and taking vitamin D but not calcium    Oncology History  Patient initially seen in [place]  Routine screening mammogram March 2022 with calcifications in the R breast   Biopsy 4/5/22: ADH  R breast lumpectomy 4/19/22: DCIS, grade 3, %/ [place] 20%  MRI breast May 2022 with abnormality   MRI guided biopsy 6/7/22: papilloma/benign    Adjuvant XRT completed July 2022    Tamoxifen November 2022    Annual mammogram/ultrasound 12/13/22 with abnormal calcifications  Biopsy 12/29/22: stromal fibrosis, repeat biopsy 1/13/23: DCIS, %/ IL 70%    Letrozole February 2023     Abnormality in R breast June 2023  Seen by [facility] surgeon (Dr. Lincoln)  R breast excision 7/13/23: lobular atrophy and epithelial atypia consistent with radiation effect. No DCIS         Patient Active Problem List   Diagnosis    History of breast cancer    Family history of colon cancer    DCIS (ductal carcinoma in situ)    Use of letrozole (Femara)       Current Outpatient Medications   Medication Instructions    cholecalciferol (vitamin D3) (VITAMIN D3) 1,000 Units, Daily    letrozole (FEMARA) 2.5 mg, Oral, Daily       Review of Systems:   Review of Systems   Constitutional: Negative.    HENT: Negative.     Respiratory: Negative.  Negative for cough and shortness of breath.    Cardiovascular: Negative.  Negative for chest pain.   Gastrointestinal: Negative.  Negative for abdominal pain and diarrhea.   Genitourinary:  Negative for frequency.    Musculoskeletal: Negative.  Negative for back pain.   Skin:  Negative for rash.   Neurological: Negative.  Negative for headaches.   Psychiatric/Behavioral:  The patient is not nervous/anxious.    All other systems reviewed and are negative.      PHYSICAL EXAM:  There were no vitals taken for this visit.    General Appearance:    Alert, cooperative, no distress, appears stated age   Lungs:     Clear to auscultation bilaterally, respirations unlabored    Heart:    Regular rate and rhythm, S1 and S2 normal   Breast Exam:    S/p right lumpectomy, no mass or nodularity. Left breast without mass, nodule or skin changes. Nipple without inversion. No axillary or supraclavicular adenopathy.   Abdomen:     Soft, non-tender, bowel sounds active, no masses, no organomegaly   Extremities:   Extremities normal, atraumatic, no cyanosis or edema   Pulses:   2+ and symmetric all extremities   Skin:   Skin color, texture, turgor normal, no rashes or lesions   Lymph nodes:   Cervical, supraclavicular, and axillary nodes normal   Neurologic:   CNII-XII intact, normal gait           Pertinent Labs & Imaging:  Pathology Results  (Last 30 days)      None            No results found for this or any previous visit (from the past 24 hours).    Assessment & Plan:    1. Ductal carcinoma in situ (DCIS) of right breast  - MRI Breast w/wo Contrast, w/CAD, Bilateral; Future    2. Ductal carcinoma in situ (DCIS) of left breast  - MRI Breast w/wo Contrast, w/CAD, Bilateral; Future    Patient with DCIS in the R breast now on letrozole  Overall tolerating treatment well  Reviewed mammogram December 2024. Will plan for MRI in June 2025  Given history of multiple biopsies with abnormalities, [facility] surgeon recommends twice yearly imaging  Follow up in 6 mos   Mammogram December 2025    Route Chart for Scheduling    Med Onc Chart Routing      Follow up with physician 1 year.   Follow up with [facility] 6 months.   Infusion scheduling note    Injection  scheduling note    Labs    Imaging    Pharmacy appointment    Other referrals                     MDM includes  :    - Acute or chronic illness or injury that poses a threat to life or bodily function  - Independent review and explanation of 1 results from unique tests  - Discussion of management and ordering 1 unique tests  - Extensive discussion of treatment and management  - Prescription drug management  - Drug therapy requiring intensive monitoring for toxicity        Imani Johns MD   03/15/2025

## 2025-04-04 ENCOUNTER — OFFICE VISIT (OUTPATIENT)
Dept: URGENT CARE | Facility: CLINIC | Age: 60
End: 2025-04-04
Payer: COMMERCIAL

## 2025-04-04 ENCOUNTER — PATIENT OUTREACH (OUTPATIENT)
Facility: OTHER | Age: 60
End: 2025-04-04
Payer: COMMERCIAL

## 2025-04-04 ENCOUNTER — HOSPITAL ENCOUNTER (EMERGENCY)
Facility: OTHER | Age: 60
Discharge: HOME | End: 2025-04-04
Attending: EMERGENCY MEDICINE
Payer: COMMERCIAL

## 2025-04-04 ENCOUNTER — OCHSNER VIRTUAL EMERGENCY DEPARTMENT (OUTPATIENT)
Facility: CLINIC | Age: 60
End: 2025-04-04
Payer: COMMERCIAL

## 2025-04-04 VITALS
OXYGEN SATURATION: 97 % | DIASTOLIC BLOOD PRESSURE: 67 MMHG | RESPIRATION RATE: 20 BRPM | BODY MASS INDEX: 25.24 KG/M2 | TEMPERATURE: 98 F | HEART RATE: 65 BPM | SYSTOLIC BLOOD PRESSURE: 131 MMHG | WEIGHT: 138 LBS

## 2025-04-04 VITALS
WEIGHT: 138 LBS | HEART RATE: 70 BPM | RESPIRATION RATE: 20 BRPM | BODY MASS INDEX: 25.4 KG/M2 | HEIGHT: 62 IN | OXYGEN SATURATION: 99 % | SYSTOLIC BLOOD PRESSURE: 125 MMHG | TEMPERATURE: 98 F | DIASTOLIC BLOOD PRESSURE: 84 MMHG

## 2025-04-04 DIAGNOSIS — T14.90XA TRAUMA: ICD-10-CM
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
## 2025-04-04 DIAGNOSIS — S62.367A NONDISPLACED FRACTURE OF NECK OF FIFTH METACARPAL BONE, LEFT HAND, INITIAL ENCOUNTER FOR CLOSED FRACTURE: ICD-10-CM

## 2025-04-04 DIAGNOSIS — S62.647A CLOSED NONDISPLACED FRACTURE OF PROXIMAL PHALANX OF LEFT LITTLE FINGER, INITIAL ENCOUNTER: ICD-10-CM

## 2025-04-04 DIAGNOSIS — S62.615A DISPLACED FRACTURE OF PROXIMAL PHALANX OF LEFT RING FINGER, INITIAL ENCOUNTER FOR CLOSED FRACTURE: Primary | ICD-10-CM

## 2025-04-04 DIAGNOSIS — S62.327A CLOSED DISPLACED FRACTURE OF SHAFT OF FIFTH METACARPAL BONE OF LEFT HAND, INITIAL ENCOUNTER: ICD-10-CM

## 2025-04-04 LAB
HCV AB SERPL QL IA: NEGATIVE
HIV 1+2 AB+HIV1 P24 AG SERPL QL IA: NEGATIVE

## 2025-04-04 PROCEDURE — 99284 EMERGENCY DEPT VISIT MOD MDM: CPT | Mod: 25

## 2025-04-04 PROCEDURE — 86803 HEPATITIS C AB TEST: CPT | Performed by: EMERGENCY MEDICINE

## 2025-04-04 PROCEDURE — 87389 HIV-1 AG W/HIV-1&-2 AB AG IA: CPT | Performed by: EMERGENCY MEDICINE

## 2025-04-04 PROCEDURE — 29125 APPL SHORT ARM SPLINT STATIC: CPT | Mod: LT

## 2025-04-04 PROCEDURE — 64450 NJX AA&/STRD OTHER PN/BRANCH: CPT | Mod: LT

## 2025-04-04 PROCEDURE — 63600175 PHARM REV CODE 636 W HCPCS

## 2025-04-04 RX ORDER — LIDOCAINE HYDROCHLORIDE 10 MG/ML
10 INJECTION, SOLUTION INFILTRATION; PERINEURAL
Status: DISCONTINUED | OUTPATIENT
Start: 2025-04-04 | End: 2025-04-04

## 2025-04-04 RX ORDER — LIDOCAINE HYDROCHLORIDE 10 MG/ML
10 INJECTION, SOLUTION INFILTRATION; PERINEURAL
Status: COMPLETED | OUTPATIENT
Start: 2025-04-04 | End: 2025-04-04

## 2025-04-04 RX ORDER — ACETAMINOPHEN 500 MG
1000 TABLET ORAL EVERY 6 HOURS PRN
Qty: 30 TABLET | Refills: 0 | Status: SHIPPED | OUTPATIENT
Start: 2025-04-04

## 2025-04-04 RX ORDER — HYDROCODONE BITARTRATE AND ACETAMINOPHEN 5; 325 MG/1; MG/1
1 TABLET ORAL EVERY 6 HOURS PRN
Qty: 12 TABLET | Refills: 0 | Status: SHIPPED | OUTPATIENT
Start: 2025-04-04

## 2025-04-04 RX ADMIN — LIDOCAINE HYDROCHLORIDE 10 ML: 10 INJECTION, SOLUTION INFILTRATION; PERINEURAL at 04:04

## 2025-04-04 NOTE — PROGRESS NOTES
"Subjective:      Patient ID: Huong Patino is a 59 y.o. female.    Vitals:  height is 5' 2" (1.575 m) and weight is 62.6 kg (138 lb). Her oral temperature is 98 °F (36.7 °C). Her blood pressure is 125/84 and her pulse is 70. Her respiration is 20 and oxygen saturation is 99%.     Chief Complaint: Hand Injury    Patient presents with left hand injury from a fall. Onset 1 hour     Patient is a 59-year-old female who presents today for a left hand pain and swelling that started an hour and a half ago after falling while playing pickleball.  She has been icing the hand ever since.  She is unable to been her left 4th and 5th digits to make a fist.  She is having tingling and numbness in these fingers.  Patient is left-hand dominant.    Hand Injury   Her dominant hand is their left hand. The incident occurred less than 1 hour ago. The injury mechanism was a fall. The pain is present in the right hand. The quality of the pain is described as aching. The pain does not radiate. The pain is at a severity of 7/10. The pain is moderate. The pain has been Constant since the incident. Associated symptoms include numbness. Pertinent negatives include no tingling. The symptoms are aggravated by movement. She has tried ice for the symptoms. The treatment provided no relief.       Musculoskeletal:  Positive for pain, trauma, joint pain and joint swelling.   Skin:  Negative for erythema.   Neurological:  Positive for numbness and tingling.      Objective:     Physical Exam   Constitutional: She is oriented to person, place, and time.  Non-toxic appearance. She does not appear ill. No distress.      Comments:Patient sits comfortably in exam chair. Answers questions in complete sentences. Does not show any signs of distress or discoloration.        HENT:   Head: Normocephalic and atraumatic.   Ears:   Right Ear: External ear normal.   Left Ear: External ear normal.   Nose: Nose normal.   Eyes: Pupils are equal, round, and reactive to " light. Extraocular movement intact   Pulmonary/Chest: Effort normal. No respiratory distress.   Abdominal: Normal appearance.   Musculoskeletal:         General: Swelling, tenderness and signs of injury present.        Hands:    Neurological: no focal deficit. She is alert and oriented to person, place, and time.   Skin: Skin is warm, dry, not diaphoretic, not pale and no rash. Capillary refill takes less than 2 seconds. No bruising, No erythema and No lesion no jaundice  Psychiatric: Her behavior is normal. Mood, judgment and thought content normal.     XR HAND COMPLETE 3 VIEW LEFT  Result Date: 4/4/2025  EXAMINATION: XR HAND COMPLETE 3 VIEW LEFT CLINICAL HISTORY: . Injury, unspecified, initial encounter TECHNIQUE: PA, lateral, and oblique views of the left hand were performed. COMPARISON: None FINDINGS: Acute transverse type fracture through the junction of the base and proximal metaphysis of the 4th proximal phalanx with mild displacement and impaction, and moderate apex angulation towards the volar and ulnar aspect. Acute oblique fracture involving the proximal metaphysis and base of the 5th proximal phalanx with mild displacement and impaction and moderate apex angulation towards the volar and ulnar aspect. Acute fracture through the distal metaphysis/neck of the 5th metacarpal with minimal apex angulation towards the dorsal aspect. Bones are well mineralized for age.  No dislocation or destructive osseous process.  Baseline minimal DJD.  There is associated soft tissue swelling about the 3 fracture sites.  No subcutaneous emphysema or radiopaque foreign body.     Acute fractures of the distal 5th metacarpal and proximal 4th and 5th proximal phalanges, as above. Electronically signed by: Phill Morelos MD Date:    04/04/2025 Time:    13:44      Assessment:     1. Displaced fracture of proximal phalanx of left ring finger, initial encounter for closed fracture    2. Trauma    3. Closed nondisplaced fracture of  proximal phalanx of left little finger, initial encounter    4. Nondisplaced fracture of neck of fifth metacarpal bone, left hand, initial encounter for closed fracture        Plan:   Patient with 3 distinct fractures of the left hand.  Two fractures in the 5th digit.  One displaced fracture in the 4th digit.  Unable to reduce fracture in clinic.  Will send patient to emergency room for further evaluation.  Spoke with Dr. Cuello and Dr. Davis who advised to reach out to luis.  Luis agreed that patient needs fracture reduced in the emergency room since today is Friday and patient would not be able to see specialist until Monday.    Displaced fracture of proximal phalanx of left ring finger, initial encounter for closed fracture  -     Refer to Emergency Dept.  -     SPLINT FOR HOME USE    Trauma  -     XR HAND COMPLETE 3 VIEW LEFT; Future; Expected date: 04/04/2025    Closed nondisplaced fracture of proximal phalanx of left little finger, initial encounter  -     SPLINT FOR HOME USE    Nondisplaced fracture of neck of fifth metacarpal bone, left hand, initial encounter for closed fracture  -     SPLINT FOR HOME USE                Patient Instructions   Please go to the emergency room as soon as possible for reduction of finger. Keep splint on until they take it off in the emergency room.

## 2025-04-04 NOTE — DISCHARGE INSTRUCTIONS
Diagnosis: Bone fractures of the left hand (metacarpale fracture of 5th and 4th)    Tests today showed:   Labs Reviewed   HEPATITIS C ANTIBODY   HIV 1 / 2 ANTIBODY     No orders to display       Treatments you had today:   Medications   LIDOcaine HCL 10 mg/ml (1%) injection 10 mL (has no administration in time range)       Follow-Up Plan:  - Follow-up with primary care doctor within 3 - 5 days  - Additional testing and/or evaluation as directed by your primary doctor    Return to the Emergency Department for symptoms including but not limited to: worsening symptoms, shortness of breath or chest pain, vomiting with inability to hold down fluids, fevers greater than 100.4°F, passing out/fainting/unconsciousness, or other concerning symptoms.

## 2025-04-04 NOTE — ED TRIAGE NOTES
Patient presents to ED with dislocated left pinky finger after playing pickleball, has temp. Cast on from Urgent care, able to move other fingers, left hand bruised, denies other symptoms, Hx of Breast CA.

## 2025-04-04 NOTE — PATIENT INSTRUCTIONS
Please go to the emergency room as soon as possible for reduction of finger. Keep splint on until they take it off in the emergency room.

## 2025-04-04 NOTE — PLAN OF CARE-OVED
[facility] Emergency Department Plan of Care Note  Referral Source: Urgent Care                               Chief Complaint   Patient presents with    Hand Injury     5th metacarpal, 4th and 5th prox phalanx fractures after fall. Unable to perform fracture dislocation reductions at .        Recommendation: Emergency Department            Emergency Department: [facility]               No diagnosis found.

## 2025-04-04 NOTE — PROGRESS NOTES
Patient seen by Radha Perez PA-C at urgent care on 4/4/25 for c/o hand injury; Eduardo consulted.     Eduardo Provider Dr Loida Fuller disposition recommendation patient to go to ED.    Patient currently at [facility] ED.    Follow up scheduled for 4/5/25 to assess for additional needs.

## 2025-04-04 NOTE — ED PROVIDER NOTES
Encounter Date: 4/4/2025       History     Chief Complaint   Patient presents with    Arm Injury     Reports sent over from  for left arm reduction for a fracture. Pt has temp splint to extremity.      Huong Patino is a 59 y.o. female who has a past medical history of Breast cancer.    The patient presents to the ED due to left hand pain.  Patient reports that she was playing pickleball at which time she lost her footing and fell on outstretched left hand patient's sustained no other injuries but felt instant pain and tingling sensation of the 5th and 4th digit.  She reports swelling in the ecchymosis that developed later on so she went to the urgent care to be evaluated.  Urgent care ordered x-ray which demonstrated 5th metacarpal and prox phalanx fracture, as well as a displaced 4th prox phalanx fracture.  Patient was later splinted and advised to go to [facility] ED for fixation of fractures.  She is left-hand dominant reports 5/10 pain mostly with flexion or extension.  Patient denies any surgery to, numbness, tingling reduced range of motion secondary to pain.    The history is provided by the patient and medical records. No  was used.     Review of patient's allergies indicates:  No Known Allergies  Past Medical History:   Diagnosis Date    Breast cancer      Past Surgical History:   Procedure Laterality Date    BREAST BIOPSY Right     BREAST CYST ASPIRATION Bilateral     BREAST LUMPECTOMY Right     BREAST SURGERY  4/19/2022    TONSILLECTOMY  1968     Family History   Problem Relation Name Age of Onset    Hypertension Mother Anais schaffer Cour     Cancer Maternal Grandfather Liborio Josephtoppel         colon cancer     Social History[1]  Review of Systems   All other systems reviewed and are negative.      Physical Exam     Initial Vitals   BP Pulse Resp Temp SpO2   04/04/25 1513 04/04/25 1513 04/04/25 1513 04/04/25 1513 04/04/25 1703   (!) 164/103 65 20 98.2 °F (36.8 °C) 99 %      MAP        --                Physical Exam    Nursing note and vitals reviewed.  Constitutional: She appears well-developed and well-nourished. She is not diaphoretic. No distress.   HENT:   Head: Normocephalic and atraumatic.   Eyes: Conjunctivae and EOM are normal. Pupils are equal, round, and reactive to light.   Neck: Neck supple.   Normal range of motion.  Cardiovascular:  Normal rate, normal heart sounds and intact distal pulses.           Pulmonary/Chest: Breath sounds normal.   Abdominal: Abdomen is soft. Bowel sounds are normal.   Musculoskeletal:      Cervical back: Normal range of motion and neck supple.      Comments: Positive Vazquez's test bilaterally.  Prominent ecchymosis and swelling of the left distal metacarpal.  Gross deformity of left 4th and phalanges interspace.  No skin breaks appreciated.  Neurovascularly intact.     Neurological: She is alert and oriented to person, place, and time. She has normal strength. GCS score is 15. GCS eye subscore is 4. GCS verbal subscore is 5. GCS motor subscore is 6.   Skin: Skin is warm and dry. Capillary refill takes less than 2 seconds.   Psychiatric: She has a normal mood and affect. Her behavior is normal. Judgment and thought content normal.         ED Course   Nerve Block    Date/Time: 4/4/2025 10:34 PM  Location procedure was performed: [facility] EMERGENCY DEPARTMENT    Performed by: Sheeba Cerna MD  Authorized by: Fredy Dash MD  Assisting provider: Fredy Dash MD  Pre-operative diagnosis: closed 5th metacarpal and prox phalanx fracture, as well as a displaced 4th prox phalanx fracture  Post-operative diagnosis: closed 5th metacarpal and prox phalanx fracture, as well as a displaced 4th prox phalanx fracture  Consent Done: Yes  Consent: Verbal consent obtained  Consent given by: patient  Patient understanding: patient states understanding of the procedure being performed  Patient consent: the patient's understanding of the procedure matches  consent given  Relevant documents: relevant documents present and verified  Imaging studies: imaging studies available  Patient identity confirmed: , MRN, name and verbally with patient  Indications: pain relief and fracture  Body area: upper extremity  Nerve: metacarpal  Laterality: left    Patient sedated: no  Description of findings: Prominent ecchymosis and swelling of the left and   Patient position: sitting  Needle size: 27 G  Local Anesthetic: lidocaine 1% without epinephrine  Anesthetic total: 10 mL  Technical procedures used: Hematoma block  Complications: No  Estimated blood loss (mL): 0  Specimens: No  Implants: No  Outcome: pain improved  Patient tolerance: Patient tolerated the procedure well with no immediate complications      Splint Application    Date/Time: 2025 10:36 PM    Performed by: Sheeba Cerna MD  Authorized by: Fredy Dash MD  Consent Done: Yes  Consent: Verbal consent obtained  Consent given by: patient  Patient understanding: patient states understanding of the procedure being performed  Patient consent: the patient's understanding of the procedure matches consent given  Imaging studies: imaging studies available  Patient identity confirmed: , N, name and verbally with patient  Location details: left arm  Splint type: ulnar gutter  Supplies used: cotton padding, Ortho-Glass and elastic bandage  Post-procedure: The splinted body part was neurovascularly unchanged following the procedure.  Patient tolerance: Patient tolerated the procedure well with no immediate complications  Comments: Patients closed and displaced fractures were reduced with traction/counter traction after hematoma nerve blockade with post reduction films confirming adequate realignment        Labs Reviewed   HEPATITIS C ANTIBODY - Normal       Result Value    Hep C Ab Interp Negative     HIV 1 / 2 ANTIBODY - Normal    HIV 1/2 Ag/Ab Negative            Imaging Results              X-Ray Finger  2 or More Views Left (Final result)  Result time 04/04/25 17:31:20      Final result by Jarod Bazan MD (04/04/25 17:31:20)                   Impression:      See above.      Electronically signed by: Jarod Bazan MD  Date:    04/04/2025  Time:    17:31               Narrative:    EXAMINATION:  XR FINGER 2 OR MORE VIEWS LEFT    CLINICAL HISTORY:  5th and 4th metacarple fracture;    TECHNIQUE:  Left 4th and 5th fingers three views.    COMPARISON:  13:36.    FINDINGS:  Redemonstration of acute distal 5th metacarpal comminuted displaced fracture is seen.  Alignment is similar to earlier exam.    Redemonstration of acute displaced fractures of the proximal aspects of the 4th and 5th finger proximal phalanx with overall improved alignment from earlier exam.                                       Medications   LIDOcaine HCL 10 mg/ml (1%) injection 10 mL (10 mLs Infiltration Given 4/4/25 1646)     Medical Decision Making  59 y.o. female as described above presenting with closed fx of left 4th and 5th metacarpal that is displaced and require ring reduction.  She is left hand dominant  Patient's current symptoms not typical of compartment syndrome, arterial or nerve injury.  Immobilization: Ulnar gutter  The dislocation has been satisfactorily reduced and immobilized, and the patient has been given a pharmacologic means to deal with their discomfort. NVID.   Disposition: Discharge. Patient has been given strict return precautions and understands the need to follow up within 48 with their primary care doctor. They understand that they may need an operation and that the care provided in the ED today is stabilizing care but not definitive care for this injury and they will also need follow up with the Hand Surgeon.       Amount and/or Complexity of Data Reviewed  Radiology: ordered.    Risk  OTC drugs.  Prescription drug management.              Attending Attestation:   Physician Attestation Statement for Resident:  As  the supervising MD   Physician Attestation Statement: I have personally seen and examined this patient.     As the supervising MD I agree with the above PE.     As the supervising MD I agree with the above treatment, course, plan, and disposition.   I was personally present during the critical portions of the procedure(s) performed by the resident and was immediately available in the ED to provide services and assistance as needed during the entire procedure.  I have reviewed and agree with the residents interpretation of the following: lab data and x-rays.  I have reviewed the following: old records at this facility, records from a referring facility, old x-rays and x-ray reports.                                       Clinical Impression:  Final diagnoses:  [W19.XXXA] Fall, initial encounter (Primary)  [S62.327A] Closed displaced fracture of shaft of fifth metacarpal bone of left hand, initial encounter          ED Disposition Condition    Discharge Stable          ED Prescriptions       Medication Sig Dispense Start Date End Date Auth. Provider    HYDROcodone-acetaminophen (NORCO) 5-325 mg per tablet Take 1 tablet by mouth every 6 (six) hours as needed for Pain. 12 tablet 4/4/2025 -- Sheeba Cerna MD    acetaminophen (TYLENOL) 500 MG tablet Take 2 tablets (1,000 mg total) by mouth every 6 (six) hours as needed for Pain. 30 tablet 4/4/2025 -- Sheeba Cerna MD          Follow-up Information       Follow up With Specialties Details Why Contact Info Additional Information    Serenity Lubin MD Internal Medicine Schedule an appointment as soon as possible for a visit in 1 week  [address]  [address]  [facility] [address]  [phone]       [facility] Emergency Dept Emergency Medicine Go to  As needed, If symptoms worsen [address]  [facility] [id number] [phone]     Delfin emilie - Orthopedics [facility] Orthopedics Schedule an appointment as soon as possible for a visit in 2  days  [address] [facility], 5th Floor  [facility] [address] [phone] [facility] - Main Building, 5th Floor Please park in [facility] and take [address] elevator               Sheeba Cerna MD  Resident  [phone]         [1]   Social History  Tobacco Use    Smoking status: Former     Current packs/day: 0.00     Average packs/day: 0.3 packs/day for 5.0 years (1.3 ttl pk-yrs)     Types: Cigarettes     Start date: 1984     Quit date: 1989     Years since quittin.2     Passive exposure: Never    Smokeless tobacco: Never   Substance Use Topics    Alcohol use: Yes     Alcohol/week: 3.0 standard drinks of alcohol     Types: 3 Glasses of wine per week     Comment: over 2-3 days    Drug use: Never        Fredy Dash MD  [phone]

## 2025-04-05 ENCOUNTER — PATIENT OUTREACH (OUTPATIENT)
Facility: OTHER | Age: 60
End: 2025-04-05
Payer: COMMERCIAL

## 2025-04-05 NOTE — PROGRESS NOTES
Patient checked in to [facility] ED on 4/4/2025.  Patient's discharge instructions were for patient to follow up with orthopedic and PCP clinic.  Patient's orthopedic appointment is scheduled for 4/7/2025 at 8:30 am with Cecy Luna PA-C.  Patient's existing PCP appointment is scheduled for 5/20/2025 at 1:40 pm with Thea Pak MD.    Will follow up with patient to assess if she has any additional needs or concerns during the week of 4/8/2025 to 4/11/2025.

## 2025-04-07 ENCOUNTER — OFFICE VISIT (OUTPATIENT)
Dept: ORTHOPEDICS | Facility: CLINIC | Age: 60
End: 2025-04-07
Payer: COMMERCIAL

## 2025-04-07 ENCOUNTER — TELEPHONE (OUTPATIENT)
Dept: ORTHOPEDICS | Facility: CLINIC | Age: 60
End: 2025-04-07
Payer: COMMERCIAL

## 2025-04-07 ENCOUNTER — PATIENT MESSAGE (OUTPATIENT)
Dept: ORTHOPEDICS | Facility: CLINIC | Age: 60
End: 2025-04-07
Payer: COMMERCIAL
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
## 2025-04-07 DIAGNOSIS — M79.642 LEFT HAND PAIN: Primary | ICD-10-CM

## 2025-04-07 DIAGNOSIS — S62.327D CLOSED DISPLACED FRACTURE OF SHAFT OF FIFTH METACARPAL BONE OF LEFT HAND WITH ROUTINE HEALING, SUBSEQUENT ENCOUNTER: Primary | ICD-10-CM

## 2025-04-07 DIAGNOSIS — S62.617D CLOSED DISPLACED FRACTURE OF PROXIMAL PHALANX OF LEFT LITTLE FINGER WITH ROUTINE HEALING, SUBSEQUENT ENCOUNTER: ICD-10-CM

## 2025-04-07 DIAGNOSIS — S62.615D CLOSED DISPLACED FRACTURE OF PROXIMAL PHALANX OF LEFT RING FINGER WITH ROUTINE HEALING, SUBSEQUENT ENCOUNTER: ICD-10-CM

## 2025-04-07 PROCEDURE — 99999 PR PBB SHADOW E&M-EST. PATIENT-LVL III: CPT | Mod: PBBFAC,,, | Performed by: PHYSICIAN ASSISTANT

## 2025-04-07 PROCEDURE — 1160F RVW MEDS BY RX/DR IN RCRD: CPT | Mod: CPTII,S$GLB,, | Performed by: PHYSICIAN ASSISTANT

## 2025-04-07 PROCEDURE — 99203 OFFICE O/P NEW LOW 30 MIN: CPT | Mod: S$GLB,,, | Performed by: PHYSICIAN ASSISTANT

## 2025-04-07 PROCEDURE — 1159F MED LIST DOCD IN RCRD: CPT | Mod: CPTII,S$GLB,, | Performed by: PHYSICIAN ASSISTANT

## 2025-04-07 NOTE — TELEPHONE ENCOUNTER
Patient communication     Notified patient to stop at [facility] Location - 1st Floor [address], Please park in [address] and use [place] elevators 30 minutes prior to your appointment time for X-ray. After your X-ray please proceed to 9th Floor [address] for Appointment on 4/9/2025 with Tara Sheldon PA-C for x-rays.     Belén Osman MA  [facility]  P: [phone]  F: [phone]

## 2025-04-07 NOTE — PROGRESS NOTES
History of Present Illness    CHIEF COMPLAINT:  Patient presents today with left hand injury after falling while playing pickleball on 4/4/2025.    HISTORY OF PRESENT ILLNESS:  She sustained a left hand injury 4 days ago from a fall while playing pickleball, her first serious injury. She has multiple fractures including the ring finger, pinky finger, and fifth metacarpal, with a dislocation that was reduced in the ED. The pinky and ring finger are very sensitive to touch. The middle finger shows improvement with increased range of motion. She notes wrist stiffness with mild pain on movement. She reports limited movement in the top knuckles and overall hand weakness with minimal  strength, only able to handle very light objects.     She is left hand dominant.    PAIN MANAGEMENT:  She reports adequate pain relief with Tylenol only and denies using prescribed hydrocodone.    SOCIAL HISTORY:  She works as a  and health .           Medications: I have reviewed medication list in the chart at the time of this encounter.     Review of patient's allergies indicates:  No Known Allergies   Past Medical History:   Diagnosis Date    Breast cancer      Past Surgical History:   Procedure Laterality Date    BREAST BIOPSY Right     BREAST CYST ASPIRATION Bilateral     BREAST LUMPECTOMY Right     BREAST SURGERY  4/19/2022    TONSILLECTOMY  1968       Review of Systems   Musculoskeletal:  Positive for falls, joint pain and myalgias.   Neurological:  Positive for weakness.       Physical Exam  Constitutional:       General: She is not in acute distress.     Appearance: She is not ill-appearing, toxic-appearing or diaphoretic.   HENT:      Head: Atraumatic.      Right Ear: External ear normal.      Left Ear: External ear normal.      Nose: Nose normal.   Eyes:      Extraocular Movements: Extraocular movements intact.   Cardiovascular:      Pulses:           Radial pulses are 2+ on the left side.   Pulmonary:       Effort: No respiratory distress.      Breath sounds: No wheezing.   Abdominal:      General: There is no distension.   Musculoskeletal:      Right wrist: Normal.      Left wrist: Swelling present. No deformity, lacerations, tenderness, bony tenderness or snuff box tenderness. Normal range of motion. Normal pulse.      Left hand: Swelling and tenderness present. No lacerations. Decreased range of motion. Decreased strength of thumb/finger opposition. Normal sensation. There is no disruption of two-point discrimination.      Cervical back: Normal range of motion. No rigidity.      Comments: Old ulnar gutter splint removed.   Skin:     Coloration: Skin is not jaundiced.      Findings: Bruising and ecchymosis present. No rash.   Neurological:      General: No focal deficit present.      Mental Status: She is alert. Mental status is at baseline.          Left Hand Exam     Range of Motion   Wrist   Extension:  normal   Flexion:  normal   Pronation:  normal   Supination:  normal     Muscle Strength   :  2/5              Imaging:  I have independently interpreted and reviewed recent x-ray of hand and post redution x-ray of fingers with patient.      1. Closed displaced fracture of shaft of fifth metacarpal bone of left hand with routine healing, subsequent encounter    2. Closed displaced fracture of proximal phalanx of left little finger with routine healing, subsequent encounter    3. Closed displaced fracture of proximal phalanx of left ring finger with routine healing, subsequent encounter    4. Fall, initial encounter       Assessment & Plan    IMPRESSION:  - Patient presents with multiple clsoed finger fractures (4th and 5th digits) and 5th metacarpal fracture of the left hand from a pickleball accident 4 days ago. Fractures appear stable based on previous XR and current physical exam. Significant swelling present, limiting full assessment of function and mobility. Neurovascularly intact with good circulation  to fingertips. Surgery may be considered depending on healing progress and functional outcomes once swelling subsides.    PATIENT EDUCATION:  - Explained nature of fractures, current limitations due to swelling, proper care and use of splint including keeping it dry during showers, and potential need for surgery based on healing progress.    ACTION ITEMS/LIFESTYLE:  - Patient to elevate hand frequently to reduce swelling. Patient to use ice for pain management, ensuring to wrap ice pack to avoid wetting splint. Patient to continue to use elbow and shoulder to prevent joint stiffness. Patient to avoid trauma to injured fingers.    MEDICATIONS:  - Continue Tylenol as needed for pain management. Started ibuprofen 600 or 800 mg every 6 hours as needed for additional pain relief. Continue hydrocodone (previously prescribed by emergency department) for significant pain only.    ORDERS:  - Applied new ulnar gutter splint to immobilize and protect injured fingers. We will avoid flexing 4th and 5th digit complete to 90 degrees since proximal fractures there, but would like to protect fingers as patient has issues with hitting her fingers on things after the first ulnar gutter splint was applied from the ED.     REFERRALS:  - Referred to hand specialist for further evaluation and potential surgical assessment, follow up as soon as possible, likely within the next week. Appointment made as detailed below.           Orders Placed This Encounter    SPLINT APPLICATION        Future Appointments   Date Time Provider Department Center   4/9/2025  2:30 PM MAINOR XROP DR [facility]   4/9/2025  3:00 PM Tara Sheldon PA-C [facility]   5/20/2025  1:40 PM [facility] Pak [facility]   9/18/2025  3:00 PM Alma Nguyen, NP [facility] [id number] Sundeep Luna PA-C  Orthopedic Surgery  [facility]

## 2025-04-08 ENCOUNTER — PATIENT OUTREACH (OUTPATIENT)
Facility: OTHER | Age: 60
End: 2025-04-08
Payer: COMMERCIAL

## 2025-04-08 NOTE — PROGRESS NOTES
Patient checked in to orthopedic appointment on 4/7/2025.  Patient outreached to assess for any additional concerns or needs, but unable to reach patient.  Left patient a voicemail for call return.  Will assist as needed in the event patient returns call.     
normal...

## 2025-05-11 ENCOUNTER — PATIENT MESSAGE (OUTPATIENT)
Dept: HEMATOLOGY/ONCOLOGY | Facility: CLINIC | Age: 60
End: 2025-05-11
Payer: COMMERCIAL

## 2025-05-12 ENCOUNTER — TELEPHONE (OUTPATIENT)
Dept: HEMATOLOGY/ONCOLOGY | Facility: CLINIC | Age: 60
End: 2025-05-12
Payer: COMMERCIAL

## 2025-06-16 ENCOUNTER — HOSPITAL ENCOUNTER (OUTPATIENT)
Dept: RADIOLOGY | Facility: OTHER | Age: 60
Discharge: HOME | End: 2025-06-16
Attending: INTERNAL MEDICINE
Payer: COMMERCIAL

## 2025-06-16 DIAGNOSIS — D05.11 DUCTAL CARCINOMA IN SITU (DCIS) OF RIGHT BREAST: ICD-10-CM

## 2025-06-16 DIAGNOSIS — D05.12 DUCTAL CARCINOMA IN SITU (DCIS) OF LEFT BREAST: ICD-10-CM

## 2025-06-16 PROCEDURE — 25500020 PHARM REV CODE 255: Performed by: INTERNAL MEDICINE

## 2025-06-16 PROCEDURE — A9577 INJ MULTIHANCE: HCPCS | Performed by: INTERNAL MEDICINE

## 2025-06-16 PROCEDURE — 77049 MRI BREAST C-+ W/CAD BI: CPT | Mod: TC

## 2025-06-16 RX ADMIN — GADOBENATE DIMEGLUMINE 12 ML: 529 INJECTION, SOLUTION INTRAVENOUS at 02:06
